# Patient Record
Sex: FEMALE | Race: WHITE | NOT HISPANIC OR LATINO
[De-identification: names, ages, dates, MRNs, and addresses within clinical notes are randomized per-mention and may not be internally consistent; named-entity substitution may affect disease eponyms.]

---

## 2020-09-28 ENCOUNTER — APPOINTMENT (OUTPATIENT)
Dept: ORTHOPEDIC SURGERY | Facility: CLINIC | Age: 65
End: 2020-09-28
Payer: MEDICARE

## 2020-09-28 VITALS
WEIGHT: 220 LBS | BODY MASS INDEX: 36.65 KG/M2 | DIASTOLIC BLOOD PRESSURE: 90 MMHG | HEART RATE: 76 BPM | HEIGHT: 65 IN | TEMPERATURE: 97.8 F | SYSTOLIC BLOOD PRESSURE: 181 MMHG

## 2020-09-28 DIAGNOSIS — Z86.39 PERSONAL HISTORY OF OTHER ENDOCRINE, NUTRITIONAL AND METABOLIC DISEASE: ICD-10-CM

## 2020-09-28 DIAGNOSIS — Z78.9 OTHER SPECIFIED HEALTH STATUS: ICD-10-CM

## 2020-09-28 DIAGNOSIS — Z86.79 PERSONAL HISTORY OF OTHER DISEASES OF THE CIRCULATORY SYSTEM: ICD-10-CM

## 2020-09-28 DIAGNOSIS — Z84.89 FAMILY HISTORY OF OTHER SPECIFIED CONDITIONS: ICD-10-CM

## 2020-09-28 PROCEDURE — 99203 OFFICE O/P NEW LOW 30 MIN: CPT

## 2020-09-28 PROCEDURE — 72110 X-RAY EXAM L-2 SPINE 4/>VWS: CPT

## 2020-09-28 RX ORDER — MULTIVITAMIN
TABLET ORAL
Refills: 0 | Status: ACTIVE | COMMUNITY

## 2020-09-28 RX ORDER — CALCIUM CARBONATE 600 MG
TABLET ORAL
Refills: 0 | Status: ACTIVE | COMMUNITY

## 2020-09-28 RX ORDER — EZETIMIBE 10 MG/1
TABLET ORAL
Refills: 0 | Status: ACTIVE | COMMUNITY

## 2020-09-29 ENCOUNTER — OUTPATIENT (OUTPATIENT)
Dept: OUTPATIENT SERVICES | Facility: HOSPITAL | Age: 65
LOS: 1 days | End: 2020-09-29
Payer: MEDICARE

## 2020-09-29 ENCOUNTER — APPOINTMENT (OUTPATIENT)
Dept: MRI IMAGING | Facility: HOSPITAL | Age: 65
End: 2020-09-29
Payer: MEDICARE

## 2020-09-29 DIAGNOSIS — M51.26 OTHER INTERVERTEBRAL DISC DISPLACEMENT, LUMBAR REGION: ICD-10-CM

## 2020-09-29 PROCEDURE — 72148 MRI LUMBAR SPINE W/O DYE: CPT

## 2020-09-29 PROCEDURE — 72148 MRI LUMBAR SPINE W/O DYE: CPT | Mod: 26

## 2020-09-29 RX ORDER — ATORVASTATIN CALCIUM 40 MG/1
40 TABLET, FILM COATED ORAL
Qty: 90 | Refills: 0 | Status: ACTIVE | COMMUNITY
Start: 2020-04-06

## 2020-09-29 RX ORDER — METOPROLOL SUCCINATE 50 MG/1
50 TABLET, EXTENDED RELEASE ORAL
Qty: 90 | Refills: 0 | Status: ACTIVE | COMMUNITY
Start: 2020-07-24

## 2020-09-29 RX ORDER — HYDROCHLOROTHIAZIDE 25 MG/1
25 TABLET ORAL
Qty: 90 | Refills: 0 | Status: ACTIVE | COMMUNITY
Start: 2020-06-30

## 2020-09-29 RX ORDER — OMEPRAZOLE 40 MG/1
40 CAPSULE, DELAYED RELEASE ORAL
Qty: 90 | Refills: 0 | Status: ACTIVE | COMMUNITY
Start: 2020-08-18

## 2020-09-29 RX ORDER — EZETIMIBE 10 MG/1
10 TABLET ORAL
Qty: 90 | Refills: 0 | Status: ACTIVE | COMMUNITY
Start: 2020-07-15

## 2020-10-05 ENCOUNTER — APPOINTMENT (OUTPATIENT)
Dept: ORTHOPEDIC SURGERY | Facility: CLINIC | Age: 65
End: 2020-10-05
Payer: MEDICARE

## 2020-10-05 VITALS — TEMPERATURE: 97.6 F | HEART RATE: 71 BPM | DIASTOLIC BLOOD PRESSURE: 82 MMHG | SYSTOLIC BLOOD PRESSURE: 169 MMHG

## 2020-10-05 PROCEDURE — 99213 OFFICE O/P EST LOW 20 MIN: CPT

## 2020-10-08 ENCOUNTER — EMERGENCY (EMERGENCY)
Facility: HOSPITAL | Age: 65
LOS: 1 days | Discharge: HOME | End: 2020-10-08
Attending: EMERGENCY MEDICINE | Admitting: EMERGENCY MEDICINE
Payer: MEDICARE

## 2020-10-08 VITALS
DIASTOLIC BLOOD PRESSURE: 92 MMHG | SYSTOLIC BLOOD PRESSURE: 163 MMHG | TEMPERATURE: 98 F | HEIGHT: 65 IN | HEART RATE: 82 BPM | OXYGEN SATURATION: 99 % | RESPIRATION RATE: 17 BRPM | WEIGHT: 220.02 LBS

## 2020-10-08 PROCEDURE — 99282 EMERGENCY DEPT VISIT SF MDM: CPT

## 2020-10-08 NOTE — ED ADULT NURSE NOTE - CHPI ED NUR SYMPTOMS NEG
no confusion/no bleeding/no tingling/no abrasion/no weakness/no loss of consciousness/no numbness/no vomiting

## 2020-10-08 NOTE — ED PROVIDER NOTE - MUSCULOSKELETAL, MLM
Spine appears normal, range of motion is not limited, no muscle or joint tenderness. No ttp over bilateral knees, no signs of trauma, no laxity, neg ant/post drawer test bilaterally, pelvis stable, normal ROM bilateral hips

## 2020-10-08 NOTE — ED PROVIDER NOTE - OBJECTIVE STATEMENT
Dr. Mejía: 65F h/o scleroderma, bulging disc, here for PT upstairs, tripped and fell on right knee, no head injury, able to ambulate, no chest pain or sob, no lightheadedness or dizziness. Pt does not want pain meds or imaging and wants to go home now.

## 2020-10-08 NOTE — ED PROVIDER NOTE - CLINICAL SUMMARY MEDICAL DECISION MAKING FREE TEXT BOX
Pt with mechanical fall on right knee, declined pain meds and imaging, no signs of trauma, can be dc'ed home.

## 2020-10-08 NOTE — ED PROVIDER NOTE - NSFOLLOWUPINSTRUCTIONS_ED_ALL_ED_FT
Knee Pain    WHAT YOU NEED TO KNOW:    Knee pain may start suddenly, or it may be a long-term problem. You may have pain on the side, front, or back of your knee. You may have knee stiffness and swelling. You may hear popping sounds or feel like your knee is giving way or locking up as you walk. You may feel pain when you sit, stand, walk, or climb up and down stairs. Knee pain can be caused by conditions such as obesity, inflammation, or strains or tears in ligaments or tendons.     DISCHARGE INSTRUCTIONS:    Return to the emergency department if:   •Your pain is worse, even after treatment.       •You cannot bend or straighten your leg completely.       •The swelling around your knee does not go down even with treatment.      •Your knee is painful and hot to the touch.       Contact your healthcare provider if:   •You have questions or concerns about your condition or care.           Medicines: You may need any of the following:   •NSAIDs help decrease swelling and pain or fever. This medicine is available with or without a doctor's order. NSAIDs can cause stomach bleeding or kidney problems in certain people. If you take blood thinner medicine, always ask your healthcare provider if NSAIDs are safe for you. Always read the medicine label and follow directions.      •Acetaminophen decreases pain and fever. It is available without a doctor's order. Ask how much to take and how often to take it. Follow directions. Read the labels of all other medicines you are using to see if they also contain acetaminophen, or ask your doctor or pharmacist. Acetaminophen can cause liver damage if not taken correctly. Do not use more than 4 grams (4,000 milligrams) total of acetaminophen in one day.       •Prescription pain medicine may be given. Ask your healthcare provider how to take this medicine safely. Some prescription pain medicines contain acetaminophen. Do not take other medicines that contain acetaminophen without talking to your healthcare provider. Too much acetaminophen may cause liver damage. Prescription pain medicine may cause constipation. Ask your healthcare provider how to prevent or treat constipation.       •Take your medicine as directed. Contact your healthcare provider if you think your medicine is not helping or if you have side effects. Tell him or her if you are allergic to any medicine. Keep a list of the medicines, vitamins, and herbs you take. Include the amounts, and when and why you take them. Bring the list or the pill bottles to follow-up visits. Carry your medicine list with you in case of an emergency.      What you can do to manage your symptoms:   •Rest your knee so it can heal. Limit activities that increase your pain. Do low-impact exercises, such as walking or swimming.       •Apply ice to help reduce swelling and pain. Use an ice pack, or put crushed ice in a plastic bag. Cover it with a towel before you apply it to your knee. Apply ice for 15 to 20 minutes every hour, or as directed.      •Apply compression to help reduce swelling. Use a brace or bandage only as directed.      •Elevate your knee to help decrease pain and swelling. Elevate your knee while you are sitting or lying down. Prop your leg on pillows to keep your knee above the level of your heart.      •Prevent your knee from moving as directed. Your healthcare provider may put on a cast or splint. You may need to wear a leg brace to stabilize your knee. A leg brace can be adjusted to increase your range of motion as your knee heals.  Hinged Knee Braces            What you can do to prevent knee pain:   •Maintain a healthy weight. Extra weight increases your risk for knee pain. Ask your healthcare provider how much you should weigh. He or she can help you create a safe weight loss plan if you need to lose weight.      •Exercise or train properly. Use the correct equipment for sports. Wear shoes that provide good support. Check your posture often as you exercise, play sports, or train for an event. This can help prevent stress and strain on your knees. Rest between sessions so you do not overwork your knees.      Follow up with your healthcare provider within 24 hours or as directed: You may need follow-up treatments, such as steroid injections to decrease pain. Write down your questions so you remember to ask them during your visits.

## 2020-10-08 NOTE — ED PROVIDER NOTE - CARE PLAN
Principal Discharge DX:	Knee injury, right, initial encounter  Secondary Diagnosis:	Fall, initial encounter

## 2020-10-08 NOTE — ED PROVIDER NOTE - CARDIAC, MLM
Normal rate, regular rhythm.  Heart sounds S1, S2.  No murmurs, rubs or gallops. (2) more than 100 beats/min

## 2020-10-08 NOTE — ED PROVIDER NOTE - PATIENT PORTAL LINK FT
You can access the FollowMyHealth Patient Portal offered by [facility] by registering at the following website: [email or web address]. By joining [employer]’s FollowMyHealth portal, you will also be able to view your health information using other applications (apps) compatible with our system.

## 2020-10-08 NOTE — ED ADULT NURSE NOTE - NSIMPLEMENTINTERV_GEN_ALL_ED
Spoke with patient who had post questions. she wanted to know if she can drive. Informed patient if she is not taking pain medications. patient made post op appointment to be seen in office on Friday. Implemented All Fall Risk Interventions:  [place] to call system. Call bell, personal items and telephone within reach. Instruct patient to call for assistance. Room bathroom lighting operational. Non-slip footwear when patient is off stretcher. Physically safe environment: no spills, clutter or unnecessary equipment. Stretcher in lowest position, wheels locked, appropriate side rails in place. Provide visual cue, wrist band, yellow gown, etc. Monitor gait and stability. Monitor for mental status changes and reorient to person, place, and time. Review medications for side effects contributing to fall risk. Reinforce activity limits and safety measures with patient and family.

## 2020-10-08 NOTE — ED ADULT NURSE NOTE - OBJECTIVE STATEMENT
patient presented to ED S/p Fall, as per patient she was walking the hallway, tripped and fell on her right knee, with both hands breaking the fall.  denies head strike, no LOC, no nausea, no dizziness no vomiting. complains of pain on right knee initially but none at the moment.

## 2020-10-12 DIAGNOSIS — S89.91XA UNSPECIFIED INJURY OF RIGHT LOWER LEG, INITIAL ENCOUNTER: ICD-10-CM

## 2020-11-02 ENCOUNTER — APPOINTMENT (OUTPATIENT)
Dept: ORTHOPEDIC SURGERY | Facility: CLINIC | Age: 65
End: 2020-11-02
Payer: MEDICARE

## 2020-11-02 ENCOUNTER — APPOINTMENT (OUTPATIENT)
Dept: MRI IMAGING | Facility: HOSPITAL | Age: 65
End: 2020-11-02
Payer: MEDICARE

## 2020-11-02 ENCOUNTER — OUTPATIENT (OUTPATIENT)
Dept: OUTPATIENT SERVICES | Facility: HOSPITAL | Age: 65
LOS: 1 days | End: 2020-11-02
Payer: MEDICARE

## 2020-11-02 VITALS — HEIGHT: 66 IN | WEIGHT: 220 LBS | TEMPERATURE: 97.2 F | BODY MASS INDEX: 35.36 KG/M2

## 2020-11-02 DIAGNOSIS — M51.26 OTHER INTERVERTEBRAL DISC DISPLACEMENT, LUMBAR REGION: ICD-10-CM

## 2020-11-02 PROCEDURE — 72148 MRI LUMBAR SPINE W/O DYE: CPT | Mod: 26

## 2020-11-02 PROCEDURE — 72148 MRI LUMBAR SPINE W/O DYE: CPT

## 2020-11-02 PROCEDURE — 99214 OFFICE O/P EST MOD 30 MIN: CPT

## 2020-11-02 RX ORDER — FUROSEMIDE 20 MG/1
20 TABLET ORAL
Qty: 30 | Refills: 0 | Status: ACTIVE | COMMUNITY
Start: 2020-10-09

## 2020-11-02 RX ORDER — CANDESARTAN CILEXETIL 16 MG/1
16 TABLET ORAL
Qty: 90 | Refills: 0 | Status: ACTIVE | COMMUNITY
Start: 2020-10-09

## 2020-11-09 ENCOUNTER — APPOINTMENT (OUTPATIENT)
Dept: ORTHOPEDIC SURGERY | Facility: CLINIC | Age: 65
End: 2020-11-09
Payer: MEDICARE

## 2020-11-09 VITALS — HEIGHT: 66 IN | BODY MASS INDEX: 35.36 KG/M2 | TEMPERATURE: 96 F | WEIGHT: 220 LBS

## 2020-11-09 PROCEDURE — 99214 OFFICE O/P EST MOD 30 MIN: CPT

## 2020-11-10 ENCOUNTER — TRANSCRIPTION ENCOUNTER (OUTPATIENT)
Age: 65
End: 2020-11-10

## 2020-11-18 ENCOUNTER — NON-APPOINTMENT (OUTPATIENT)
Age: 65
End: 2020-11-18

## 2020-11-22 ENCOUNTER — OUTPATIENT (OUTPATIENT)
Dept: OUTPATIENT SERVICES | Facility: HOSPITAL | Age: 65
LOS: 1 days | End: 2020-11-22
Payer: MEDICARE

## 2020-11-22 DIAGNOSIS — Z11.59 ENCOUNTER FOR SCREENING FOR OTHER VIRAL DISEASES: ICD-10-CM

## 2020-11-22 LAB — SARS-COV-2 RNA SPEC QL NAA+PROBE: SIGNIFICANT CHANGE UP

## 2020-11-22 PROCEDURE — U0003: CPT

## 2020-11-24 ENCOUNTER — OUTPATIENT (OUTPATIENT)
Dept: OUTPATIENT SERVICES | Facility: HOSPITAL | Age: 65
LOS: 1 days | End: 2020-11-24
Payer: MEDICARE

## 2020-11-24 DIAGNOSIS — M54.16 RADICULOPATHY, LUMBAR REGION: ICD-10-CM

## 2020-11-24 PROCEDURE — 64483 NJX AA&/STRD TFRM EPI L/S 1: CPT | Mod: RT

## 2020-12-09 ENCOUNTER — APPOINTMENT (OUTPATIENT)
Dept: ORTHOPEDIC SURGERY | Facility: CLINIC | Age: 65
End: 2020-12-09
Payer: MEDICARE

## 2020-12-09 VITALS — BODY MASS INDEX: 35.36 KG/M2 | TEMPERATURE: 97 F | HEIGHT: 66 IN | WEIGHT: 220 LBS

## 2020-12-09 DIAGNOSIS — M53.9 DORSOPATHY, UNSPECIFIED: ICD-10-CM

## 2020-12-09 PROCEDURE — 99214 OFFICE O/P EST MOD 30 MIN: CPT

## 2020-12-23 LAB
ALBUMIN SERPL ELPH-MCNC: 4.3 G/DL
ALP BLD-CCNC: 71 U/L
ALT SERPL-CCNC: 16 U/L
ANION GAP SERPL CALC-SCNC: 11 MMOL/L
APTT BLD: 35.4 SEC
AST SERPL-CCNC: 26 U/L
BASOPHILS # BLD AUTO: 0.05 K/UL
BASOPHILS NFR BLD AUTO: 0.7 %
BILIRUB SERPL-MCNC: 0.5 MG/DL
BUN SERPL-MCNC: 16 MG/DL
CALCIUM SERPL-MCNC: 9.6 MG/DL
CHLORIDE SERPL-SCNC: 98 MMOL/L
CO2 SERPL-SCNC: 29 MMOL/L
CREAT SERPL-MCNC: 0.95 MG/DL
EOSINOPHIL # BLD AUTO: 0.24 K/UL
EOSINOPHIL NFR BLD AUTO: 3.2 %
GLUCOSE SERPL-MCNC: 99 MG/DL
HCT VFR BLD CALC: 40.9 %
HGB BLD-MCNC: 13.3 G/DL
IMM GRANULOCYTES NFR BLD AUTO: 0.3 %
INR PPP: 1.07 RATIO
LYMPHOCYTES # BLD AUTO: 1.16 K/UL
LYMPHOCYTES NFR BLD AUTO: 15.4 %
MAN DIFF?: NORMAL
MCHC RBC-ENTMCNC: 32.5 GM/DL
MCHC RBC-ENTMCNC: 33.4 PG
MCV RBC AUTO: 102.8 FL
MONOCYTES # BLD AUTO: 0.94 K/UL
MONOCYTES NFR BLD AUTO: 12.5 %
NEUTROPHILS # BLD AUTO: 5.12 K/UL
NEUTROPHILS NFR BLD AUTO: 67.9 %
PLATELET # BLD AUTO: 230 K/UL
POTASSIUM SERPL-SCNC: 4.4 MMOL/L
PROT SERPL-MCNC: 6.8 G/DL
PT BLD: 12.6 SEC
RBC # BLD: 3.98 M/UL
RBC # FLD: 12.6 %
SODIUM SERPL-SCNC: 137 MMOL/L
WBC # FLD AUTO: 7.53 K/UL

## 2021-01-04 ENCOUNTER — OUTPATIENT (OUTPATIENT)
Dept: OUTPATIENT SERVICES | Facility: HOSPITAL | Age: 66
LOS: 1 days | End: 2021-01-04
Payer: MEDICARE

## 2021-01-04 VITALS
OXYGEN SATURATION: 100 % | DIASTOLIC BLOOD PRESSURE: 72 MMHG | HEIGHT: 65 IN | TEMPERATURE: 97 F | WEIGHT: 220.02 LBS | HEART RATE: 70 BPM | SYSTOLIC BLOOD PRESSURE: 179 MMHG | RESPIRATION RATE: 21 BRPM

## 2021-01-04 DIAGNOSIS — M51.26 OTHER INTERVERTEBRAL DISC DISPLACEMENT, LUMBAR REGION: ICD-10-CM

## 2021-01-04 DIAGNOSIS — Z01.818 ENCOUNTER FOR OTHER PREPROCEDURAL EXAMINATION: ICD-10-CM

## 2021-01-04 DIAGNOSIS — Z98.891 HISTORY OF UTERINE SCAR FROM PREVIOUS SURGERY: Chronic | ICD-10-CM

## 2021-01-04 DIAGNOSIS — Z98.890 OTHER SPECIFIED POSTPROCEDURAL STATES: Chronic | ICD-10-CM

## 2021-01-04 RX ORDER — DIAZEPAM 5 MG
1 TABLET ORAL
Qty: 0 | Refills: 0 | DISCHARGE

## 2021-01-04 RX ORDER — CANDESARTAN CILEXETIL 8 MG/1
1 TABLET ORAL
Qty: 0 | Refills: 0 | DISCHARGE

## 2021-01-04 RX ORDER — CYCLOBENZAPRINE HYDROCHLORIDE 10 MG/1
0 TABLET, FILM COATED ORAL
Qty: 0 | Refills: 0 | DISCHARGE

## 2021-01-04 RX ORDER — ATORVASTATIN CALCIUM 80 MG/1
1 TABLET, FILM COATED ORAL
Qty: 0 | Refills: 0 | DISCHARGE

## 2021-01-04 RX ORDER — GABAPENTIN 400 MG/1
0 CAPSULE ORAL
Qty: 0 | Refills: 0 | DISCHARGE

## 2021-01-04 NOTE — H&P PST ADULT - NSICDXPASTMEDICALHX_GEN_ALL_CORE_FT
PAST MEDICAL HISTORY:  Calcinosis, Raynaud's phenomenon, sclerodactyly, and telangiectasia (CREST) syndrome     Heart murmur     History of pleural effusion right 2014 ; resolved    HLD (hyperlipidemia)     HNP (herniated nucleus pulposus), lumbar     HTN (hypertension)     Leg swelling

## 2021-01-04 NOTE — H&P PST ADULT - HISTORY OF PRESENT ILLNESS
This is a 64 y/o female with 5 month history of persistent lower back pain radiating down right leg, shin /ankle with numbness, weakness and recurrent falls . She has been experiencing sharp pain since August and tried PT, MARTÍN and pain medication with no improvement . scheduled for microdiscectomy L4-5 right and any other indicated procedures on 1/12/20

## 2021-01-04 NOTE — H&P PST ADULT - ASSESSMENT
64 y/o female with HNP lumbar     scheduled for microdiscectomy L4-5 right and any other procedures   medical and cardiac clearance   pre op instructions on medications   COVID testing on 1/10/21

## 2021-01-04 NOTE — H&P PST ADULT - NSICDXFAMILYHX_GEN_ALL_CORE_FT
FAMILY HISTORY:  Family history of abdominal aortic aneurysm (AAA), mother  Family history of diabetes mellitus in sister  FHx: heart disease, parents

## 2021-01-04 NOTE — H&P PST ADULT - SOURCE OF INFORMATION, PROFILE
Medical history obtained via telephone as per COVID protocol. Physical exam to be done on DOS/patient

## 2021-01-04 NOTE — H&P PST ADULT - ENERGY EXPENDITURE (METS)
4 mets Purse String (Simple) Text: Given the location of the defect and the characteristics of the surrounding skin a purse string closure was deemed most appropriate.  Undermining was performed circumfirentially around the surgical defect.  A purse string suture was then placed and tightened.

## 2021-01-06 PROBLEM — M51.26 OTHER INTERVERTEBRAL DISC DISPLACEMENT, LUMBAR REGION: Chronic | Status: ACTIVE | Noted: 2021-01-04

## 2021-01-06 PROBLEM — E78.5 HYPERLIPIDEMIA, UNSPECIFIED: Chronic | Status: ACTIVE | Noted: 2021-01-04

## 2021-01-06 PROBLEM — Z87.09 PERSONAL HISTORY OF OTHER DISEASES OF THE RESPIRATORY SYSTEM: Chronic | Status: ACTIVE | Noted: 2021-01-04

## 2021-01-06 PROBLEM — R01.1 CARDIAC MURMUR, UNSPECIFIED: Chronic | Status: ACTIVE | Noted: 2021-01-04

## 2021-01-06 PROBLEM — M34.1 CR(E)ST SYNDROME: Chronic | Status: ACTIVE | Noted: 2021-01-04

## 2021-01-06 PROBLEM — M79.89 OTHER SPECIFIED SOFT TISSUE DISORDERS: Chronic | Status: ACTIVE | Noted: 2021-01-04

## 2021-01-06 PROBLEM — I10 ESSENTIAL (PRIMARY) HYPERTENSION: Chronic | Status: ACTIVE | Noted: 2021-01-04

## 2021-01-06 PROCEDURE — G0463: CPT

## 2021-01-07 NOTE — PROVIDER CONTACT NOTE (OTHER) - ASSESSMENT
The Spine Pre-Operative Education packet was given to the patient on 12/9/20. The patient and NP reviewed the information included in the packet. All her questions were answered and she gave a clear understanding of the instructions. She was advised to call the office at any time with further questions or concerns.

## 2021-01-10 ENCOUNTER — OUTPATIENT (OUTPATIENT)
Dept: OUTPATIENT SERVICES | Facility: HOSPITAL | Age: 66
LOS: 1 days | End: 2021-01-10
Payer: MEDICARE

## 2021-01-10 DIAGNOSIS — Z20.828 CONTACT WITH AND (SUSPECTED) EXPOSURE TO OTHER VIRAL COMMUNICABLE DISEASES: ICD-10-CM

## 2021-01-10 DIAGNOSIS — Z98.890 OTHER SPECIFIED POSTPROCEDURAL STATES: Chronic | ICD-10-CM

## 2021-01-10 DIAGNOSIS — Z98.891 HISTORY OF UTERINE SCAR FROM PREVIOUS SURGERY: Chronic | ICD-10-CM

## 2021-01-10 LAB — SARS-COV-2 RNA SPEC QL NAA+PROBE: SIGNIFICANT CHANGE UP

## 2021-01-10 PROCEDURE — U0005: CPT

## 2021-01-10 PROCEDURE — U0003: CPT

## 2021-01-11 ENCOUNTER — TRANSCRIPTION ENCOUNTER (OUTPATIENT)
Age: 66
End: 2021-01-11

## 2021-01-11 NOTE — ASU PATIENT PROFILE, ADULT - SURGICAL SITE INCISION
Detail Level: Simple Continue Regimen: Ketoconazole shampoo 2-3 times weekly for maintenance Plan: Discussed fluocinonide solution for occasional itching pt gets on scalp.  Pt defers Rx at this time, will call the office if needed in the future no

## 2021-01-11 NOTE — ASU PATIENT PROFILE, ADULT - PMH
Calcinosis, Raynaud's phenomenon, sclerodactyly, and telangiectasia (CREST) syndrome    Heart murmur    History of pleural effusion  right 2014 ; resolved  HLD (hyperlipidemia)    HNP (herniated nucleus pulposus), lumbar    HTN (hypertension)    Leg swelling

## 2021-01-12 ENCOUNTER — RESULT REVIEW (OUTPATIENT)
Age: 66
End: 2021-01-12

## 2021-01-12 ENCOUNTER — APPOINTMENT (OUTPATIENT)
Dept: ORTHOPEDIC SURGERY | Facility: HOSPITAL | Age: 66
End: 2021-01-12

## 2021-01-12 ENCOUNTER — OUTPATIENT (OUTPATIENT)
Dept: OUTPATIENT SERVICES | Facility: HOSPITAL | Age: 66
LOS: 1 days | End: 2021-01-12
Payer: MEDICARE

## 2021-01-12 VITALS
SYSTOLIC BLOOD PRESSURE: 161 MMHG | OXYGEN SATURATION: 100 % | RESPIRATION RATE: 15 BRPM | HEART RATE: 65 BPM | DIASTOLIC BLOOD PRESSURE: 74 MMHG

## 2021-01-12 DIAGNOSIS — M51.26 OTHER INTERVERTEBRAL DISC DISPLACEMENT, LUMBAR REGION: ICD-10-CM

## 2021-01-12 DIAGNOSIS — Z98.891 HISTORY OF UTERINE SCAR FROM PREVIOUS SURGERY: Chronic | ICD-10-CM

## 2021-01-12 DIAGNOSIS — Z98.890 OTHER SPECIFIED POSTPROCEDURAL STATES: Chronic | ICD-10-CM

## 2021-01-12 LAB — BLD GP AB SCN SERPL QL: SIGNIFICANT CHANGE UP

## 2021-01-12 PROCEDURE — 86900 BLOOD TYPING SEROLOGIC ABO: CPT

## 2021-01-12 PROCEDURE — 76000 FLUOROSCOPY <1 HR PHYS/QHP: CPT

## 2021-01-12 PROCEDURE — 97161 PT EVAL LOW COMPLEX 20 MIN: CPT

## 2021-01-12 PROCEDURE — 86850 RBC ANTIBODY SCREEN: CPT

## 2021-01-12 PROCEDURE — 63030 LAMOT DCMPRN NRV RT 1 LMBR: CPT | Mod: RT

## 2021-01-12 PROCEDURE — 88304 TISSUE EXAM BY PATHOLOGIST: CPT

## 2021-01-12 PROCEDURE — C1889: CPT

## 2021-01-12 PROCEDURE — 86901 BLOOD TYPING SEROLOGIC RH(D): CPT

## 2021-01-12 PROCEDURE — 36415 COLL VENOUS BLD VENIPUNCTURE: CPT

## 2021-01-12 PROCEDURE — 63030 LAMOT DCMPRN NRV RT 1 LMBR: CPT | Mod: 82,RT

## 2021-01-12 PROCEDURE — 88304 TISSUE EXAM BY PATHOLOGIST: CPT | Mod: 26

## 2021-01-12 RX ORDER — SODIUM CHLORIDE 9 MG/ML
1000 INJECTION, SOLUTION INTRAVENOUS
Refills: 0 | Status: DISCONTINUED | OUTPATIENT
Start: 2021-01-12 | End: 2021-01-12

## 2021-01-12 RX ORDER — DIAZEPAM 5 MG
1 TABLET ORAL
Qty: 10 | Refills: 0
Start: 2021-01-12

## 2021-01-12 RX ORDER — APREPITANT 80 MG/1
40 CAPSULE ORAL ONCE
Refills: 0 | Status: COMPLETED | OUTPATIENT
Start: 2021-01-12 | End: 2021-01-12

## 2021-01-12 RX ORDER — HYDROMORPHONE HYDROCHLORIDE 2 MG/ML
0.5 INJECTION INTRAMUSCULAR; INTRAVENOUS; SUBCUTANEOUS
Refills: 0 | Status: DISCONTINUED | OUTPATIENT
Start: 2021-01-12 | End: 2021-01-12

## 2021-01-12 RX ORDER — CEFAZOLIN SODIUM 1 G
2000 VIAL (EA) INJECTION ONCE
Refills: 0 | Status: DISCONTINUED | OUTPATIENT
Start: 2021-01-12 | End: 2021-01-12

## 2021-01-12 RX ORDER — ONDANSETRON 8 MG/1
4 TABLET, FILM COATED ORAL ONCE
Refills: 0 | Status: COMPLETED | OUTPATIENT
Start: 2021-01-12 | End: 2021-01-12

## 2021-01-12 RX ORDER — OXYCODONE HYDROCHLORIDE 5 MG/1
1 TABLET ORAL
Qty: 42 | Refills: 0
Start: 2021-01-12

## 2021-01-12 RX ORDER — CEFAZOLIN SODIUM 1 G
2000 VIAL (EA) INJECTION ONCE
Refills: 0 | Status: COMPLETED | OUTPATIENT
Start: 2021-01-12 | End: 2021-01-12

## 2021-01-12 RX ORDER — OXYCODONE HYDROCHLORIDE 5 MG/1
5 TABLET ORAL ONCE
Refills: 0 | Status: DISCONTINUED | OUTPATIENT
Start: 2021-01-12 | End: 2021-01-12

## 2021-01-12 RX ADMIN — HYDROMORPHONE HYDROCHLORIDE 0.5 MILLIGRAM(S): 2 INJECTION INTRAMUSCULAR; INTRAVENOUS; SUBCUTANEOUS at 11:06

## 2021-01-12 RX ADMIN — SODIUM CHLORIDE 75 MILLILITER(S): 9 INJECTION, SOLUTION INTRAVENOUS at 11:06

## 2021-01-12 RX ADMIN — APREPITANT 40 MILLIGRAM(S): 80 CAPSULE ORAL at 07:34

## 2021-01-12 RX ADMIN — ONDANSETRON 4 MILLIGRAM(S): 8 TABLET, FILM COATED ORAL at 11:06

## 2021-01-12 RX ADMIN — HYDROMORPHONE HYDROCHLORIDE 0.5 MILLIGRAM(S): 2 INJECTION INTRAMUSCULAR; INTRAVENOUS; SUBCUTANEOUS at 11:43

## 2021-01-12 NOTE — ASU DISCHARGE PLAN (ADULT/PEDIATRIC) - CALL YOUR DOCTOR IF YOU HAVE ANY OF THE FOLLOWING:
Bleeding that does not stop/Pain not relieved by Medications/Fever greater than (need to indicate Fahrenheit or Celsius)/Wound/Surgical Site with redness, or foul smelling discharge or pus/Unable to urinate/Inability to tolerate liquids or foods Bleeding that does not stop/Pain not relieved by Medications/Fever greater than (need to indicate Fahrenheit or Celsius)/Wound/Surgical Site with redness, or foul smelling discharge or pus/Numbness, tingling, color or temperature change to extremity/Unable to urinate/Inability to tolerate liquids or foods

## 2021-01-12 NOTE — ASU DISCHARGE PLAN (ADULT/PEDIATRIC) - ASU DC SPECIAL INSTRUCTIONSFT
You have just had spine surgery.  Please take care of your back by adhering to some basic recommendations.    Your surgeon recommends taking acetaminophen (tylenol) 1000mg 3 times per day for the next 14 to 21 days.  Apply icepacks to the surgical area to reduce swelling and discomfort.  This can help to minimize the need for stronger medications.    No heavy lifting. Do not lift more than 10 pounds.  Avoid twisting and bending over.  Do NOT drive a car.  You may be driven in a car.    You may shower if the dressing is the clear plastic type or if you cover the existing dressing with plastic and tape to prevent it from getting wet.  Change dressing with dry, sterile gauze and tape if it becomes loose, wet or soiled.     Observe low back precautions as described by the Physical Therapist.  Walking is your best exercise.  It is best not to remain in one position for long periods such as long car rides.    Constipation is a common problem associated with surgery and pain medications.  You should ensure that you are drinking plenty of fluids and increasing your fruit and vegetable intake.  You are advised to take Docusate 100mg three times per day to prevent opioid induced constipation.  To treat opioid induced constipation use Senna (Senokot) or Bisacodyl (Dulcolax) or PEG 3350 (Miralax) every evening until your first bowel movement and then every evening as needed.  To treat opioid induced bloating and gas pain use Simethicone (Gas-X) 80 mg per label directions.     Smoking should be avoided.  Tobacco products are associated with back problems.       Call the doctor with questions, fevers, severe headache, bowel or bladder dysfunction, new numbness/weakness or new pain not relieved by medication, ice pack and change of position.    You should see your surgeon for follow up within 14 days of surgery.

## 2021-01-12 NOTE — ASU DISCHARGE PLAN (ADULT/PEDIATRIC) - CARE PROVIDER_API CALL
Stone Hodgson (MD)  Orthopaedic Surgery  [address], [address]  [place] [address]  Phone: [phone]  Fax: [phone]  Follow Up Time:

## 2021-01-12 NOTE — BRIEF OPERATIVE NOTE - NSICDXBRIEFPOSTOP_GEN_ALL_CORE_FT
POST-OP DIAGNOSIS:  HNP (herniated nucleus pulposus), lumbar 12-Jan-2021 10:25:50 L4-5 Right Chuckie Perez

## 2021-01-12 NOTE — BRIEF OPERATIVE NOTE - NSICDXBRIEFPREOP_GEN_ALL_CORE_FT
PRE-OP DIAGNOSIS:  HNP (herniated nucleus pulposus), lumbar 12-Jan-2021 10:25:34 L4-5 Right Chuckie Perez

## 2021-01-13 ENCOUNTER — NON-APPOINTMENT (OUTPATIENT)
Age: 66
End: 2021-01-13

## 2021-01-25 ENCOUNTER — APPOINTMENT (OUTPATIENT)
Dept: ORTHOPEDIC SURGERY | Facility: CLINIC | Age: 66
End: 2021-01-25
Payer: MEDICARE

## 2021-01-25 VITALS — HEIGHT: 66 IN | TEMPERATURE: 98 F | BODY MASS INDEX: 35.36 KG/M2 | WEIGHT: 220 LBS

## 2021-01-25 DIAGNOSIS — M51.26 OTHER INTERVERTEBRAL DISC DISPLACEMENT, LUMBAR REGION: ICD-10-CM

## 2021-01-25 PROCEDURE — 99024 POSTOP FOLLOW-UP VISIT: CPT

## 2021-01-25 PROCEDURE — 72100 X-RAY EXAM L-S SPINE 2/3 VWS: CPT

## 2021-01-25 RX ORDER — GABAPENTIN 300 MG/1
300 CAPSULE ORAL
Qty: 90 | Refills: 0 | Status: ACTIVE | COMMUNITY
Start: 2020-12-11

## 2021-01-25 RX ORDER — OXYCODONE 5 MG/1
5 TABLET ORAL
Qty: 42 | Refills: 0 | Status: ACTIVE | COMMUNITY
Start: 2021-01-12

## 2021-01-25 RX ORDER — PREDNISONE 10 MG/1
10 TABLET ORAL
Qty: 50 | Refills: 0 | Status: COMPLETED | COMMUNITY
Start: 2020-09-08 | End: 2021-01-25

## 2021-01-25 RX ORDER — OXYCODONE AND ACETAMINOPHEN 7.5; 325 MG/1; MG/1
7.5-325 TABLET ORAL
Qty: 42 | Refills: 0 | Status: COMPLETED | COMMUNITY
Start: 2020-09-15 | End: 2021-01-25

## 2021-01-25 RX ORDER — GABAPENTIN 100 MG/1
100 CAPSULE ORAL
Qty: 90 | Refills: 0 | Status: DISCONTINUED | COMMUNITY
Start: 2020-09-28 | End: 2021-01-25

## 2021-01-25 RX ORDER — CYCLOBENZAPRINE HYDROCHLORIDE 10 MG/1
10 TABLET, FILM COATED ORAL
Qty: 60 | Refills: 0 | Status: DISCONTINUED | COMMUNITY
Start: 2020-09-08 | End: 2021-01-25

## 2021-01-25 RX ORDER — DIAZEPAM 5 MG/1
5 TABLET ORAL
Qty: 30 | Refills: 0 | Status: DISCONTINUED | COMMUNITY
Start: 2020-08-27 | End: 2021-01-25

## 2021-01-25 RX ORDER — METHYLPREDNISOLONE 4 MG/1
4 TABLET ORAL
Qty: 21 | Refills: 0 | Status: COMPLETED | COMMUNITY
Start: 2020-08-18 | End: 2021-01-25

## 2021-02-26 DIAGNOSIS — Z01.818 ENCOUNTER FOR OTHER PREPROCEDURAL EXAMINATION: ICD-10-CM

## 2021-02-26 DIAGNOSIS — Z01.812 ENCOUNTER FOR PREPROCEDURAL LABORATORY EXAMINATION: ICD-10-CM

## 2021-03-08 ENCOUNTER — APPOINTMENT (OUTPATIENT)
Dept: ORTHOPEDIC SURGERY | Facility: CLINIC | Age: 66
End: 2021-03-08
Payer: MEDICARE

## 2021-03-08 VITALS
BODY MASS INDEX: 34.72 KG/M2 | HEART RATE: 69 BPM | WEIGHT: 216 LBS | SYSTOLIC BLOOD PRESSURE: 173 MMHG | TEMPERATURE: 96.7 F | HEIGHT: 66 IN | DIASTOLIC BLOOD PRESSURE: 68 MMHG

## 2021-03-08 PROCEDURE — 99024 POSTOP FOLLOW-UP VISIT: CPT

## 2021-04-19 ENCOUNTER — APPOINTMENT (OUTPATIENT)
Dept: ORTHOPEDIC SURGERY | Facility: CLINIC | Age: 66
End: 2021-04-19
Payer: MEDICARE

## 2021-04-19 VITALS
DIASTOLIC BLOOD PRESSURE: 80 MMHG | TEMPERATURE: 97.5 F | HEIGHT: 66 IN | HEART RATE: 69 BPM | SYSTOLIC BLOOD PRESSURE: 179 MMHG | WEIGHT: 215 LBS | BODY MASS INDEX: 34.55 KG/M2

## 2021-04-19 PROCEDURE — 99213 OFFICE O/P EST LOW 20 MIN: CPT

## 2021-04-19 RX ORDER — CEPHALEXIN 500 MG/1
500 CAPSULE ORAL
Qty: 30 | Refills: 0 | Status: ACTIVE | COMMUNITY
Start: 2020-11-02

## 2021-07-19 ENCOUNTER — APPOINTMENT (OUTPATIENT)
Dept: ORTHOPEDIC SURGERY | Facility: CLINIC | Age: 66
End: 2021-07-19
Payer: MEDICARE

## 2021-07-19 VITALS — DIASTOLIC BLOOD PRESSURE: 83 MMHG | HEART RATE: 61 BPM | SYSTOLIC BLOOD PRESSURE: 170 MMHG

## 2021-07-19 DIAGNOSIS — Z98.890 OTHER SPECIFIED POSTPROCEDURAL STATES: ICD-10-CM

## 2021-07-19 PROCEDURE — 99214 OFFICE O/P EST MOD 30 MIN: CPT

## 2023-11-13 ENCOUNTER — INPATIENT (INPATIENT)
Facility: HOSPITAL | Age: 68
LOS: 1 days | Discharge: HOME | DRG: 64 | End: 2023-11-15
Attending: STUDENT IN AN ORGANIZED HEALTH CARE EDUCATION/TRAINING PROGRAM | Admitting: FAMILY MEDICINE
Payer: MEDICARE

## 2023-11-13 VITALS
DIASTOLIC BLOOD PRESSURE: 88 MMHG | RESPIRATION RATE: 16 BRPM | SYSTOLIC BLOOD PRESSURE: 179 MMHG | TEMPERATURE: 98 F | WEIGHT: 220.02 LBS | HEART RATE: 60 BPM | HEIGHT: 65 IN | OXYGEN SATURATION: 99 %

## 2023-11-13 DIAGNOSIS — Z98.891 HISTORY OF UTERINE SCAR FROM PREVIOUS SURGERY: Chronic | ICD-10-CM

## 2023-11-13 DIAGNOSIS — I63.9 CEREBRAL INFARCTION, UNSPECIFIED: ICD-10-CM

## 2023-11-13 DIAGNOSIS — Z98.890 OTHER SPECIFIED POSTPROCEDURAL STATES: Chronic | ICD-10-CM

## 2023-11-13 LAB
ALBUMIN SERPL ELPH-MCNC: 3.9 G/DL — SIGNIFICANT CHANGE UP (ref 3.3–5)
ALBUMIN SERPL ELPH-MCNC: 3.9 G/DL — SIGNIFICANT CHANGE UP (ref 3.3–5)
ALP SERPL-CCNC: 84 U/L — SIGNIFICANT CHANGE UP (ref 40–120)
ALP SERPL-CCNC: 84 U/L — SIGNIFICANT CHANGE UP (ref 40–120)
ALT FLD-CCNC: 25 U/L — SIGNIFICANT CHANGE UP (ref 10–45)
ALT FLD-CCNC: 25 U/L — SIGNIFICANT CHANGE UP (ref 10–45)
ANION GAP SERPL CALC-SCNC: 9 MMOL/L — SIGNIFICANT CHANGE UP (ref 5–17)
ANION GAP SERPL CALC-SCNC: 9 MMOL/L — SIGNIFICANT CHANGE UP (ref 5–17)
APPEARANCE UR: CLEAR — SIGNIFICANT CHANGE UP
APPEARANCE UR: CLEAR — SIGNIFICANT CHANGE UP
APTT BLD: 36.9 SEC — HIGH (ref 24.5–35.6)
APTT BLD: 36.9 SEC — HIGH (ref 24.5–35.6)
AST SERPL-CCNC: 35 U/L — SIGNIFICANT CHANGE UP (ref 10–40)
AST SERPL-CCNC: 35 U/L — SIGNIFICANT CHANGE UP (ref 10–40)
BACTERIA # UR AUTO: NEGATIVE /HPF — SIGNIFICANT CHANGE UP
BACTERIA # UR AUTO: NEGATIVE /HPF — SIGNIFICANT CHANGE UP
BASOPHILS # BLD AUTO: 0.06 K/UL — SIGNIFICANT CHANGE UP (ref 0–0.2)
BASOPHILS # BLD AUTO: 0.06 K/UL — SIGNIFICANT CHANGE UP (ref 0–0.2)
BASOPHILS NFR BLD AUTO: 0.7 % — SIGNIFICANT CHANGE UP (ref 0–2)
BASOPHILS NFR BLD AUTO: 0.7 % — SIGNIFICANT CHANGE UP (ref 0–2)
BILIRUB SERPL-MCNC: 0.5 MG/DL — SIGNIFICANT CHANGE UP (ref 0.2–1.2)
BILIRUB SERPL-MCNC: 0.5 MG/DL — SIGNIFICANT CHANGE UP (ref 0.2–1.2)
BILIRUB UR-MCNC: NEGATIVE — SIGNIFICANT CHANGE UP
BILIRUB UR-MCNC: NEGATIVE — SIGNIFICANT CHANGE UP
BUN SERPL-MCNC: 27 MG/DL — HIGH (ref 7–23)
BUN SERPL-MCNC: 27 MG/DL — HIGH (ref 7–23)
CALCIUM SERPL-MCNC: 9.9 MG/DL — SIGNIFICANT CHANGE UP (ref 8.4–10.5)
CALCIUM SERPL-MCNC: 9.9 MG/DL — SIGNIFICANT CHANGE UP (ref 8.4–10.5)
CHLORIDE SERPL-SCNC: 99 MMOL/L — SIGNIFICANT CHANGE UP (ref 96–108)
CHLORIDE SERPL-SCNC: 99 MMOL/L — SIGNIFICANT CHANGE UP (ref 96–108)
CO2 SERPL-SCNC: 25 MMOL/L — SIGNIFICANT CHANGE UP (ref 22–31)
CO2 SERPL-SCNC: 25 MMOL/L — SIGNIFICANT CHANGE UP (ref 22–31)
COLOR SPEC: YELLOW — SIGNIFICANT CHANGE UP
COLOR SPEC: YELLOW — SIGNIFICANT CHANGE UP
CREAT SERPL-MCNC: 1.06 MG/DL — SIGNIFICANT CHANGE UP (ref 0.5–1.3)
CREAT SERPL-MCNC: 1.06 MG/DL — SIGNIFICANT CHANGE UP (ref 0.5–1.3)
DIFF PNL FLD: NEGATIVE — SIGNIFICANT CHANGE UP
DIFF PNL FLD: NEGATIVE — SIGNIFICANT CHANGE UP
EGFR: 57 ML/MIN/1.73M2 — LOW
EGFR: 57 ML/MIN/1.73M2 — LOW
EOSINOPHIL # BLD AUTO: 0.1 K/UL — SIGNIFICANT CHANGE UP (ref 0–0.5)
EOSINOPHIL # BLD AUTO: 0.1 K/UL — SIGNIFICANT CHANGE UP (ref 0–0.5)
EOSINOPHIL NFR BLD AUTO: 1.1 % — SIGNIFICANT CHANGE UP (ref 0–6)
EOSINOPHIL NFR BLD AUTO: 1.1 % — SIGNIFICANT CHANGE UP (ref 0–6)
EPI CELLS # UR: 1 — SIGNIFICANT CHANGE UP
EPI CELLS # UR: 1 — SIGNIFICANT CHANGE UP
GLUCOSE SERPL-MCNC: 114 MG/DL — HIGH (ref 70–99)
GLUCOSE SERPL-MCNC: 114 MG/DL — HIGH (ref 70–99)
GLUCOSE UR QL: NEGATIVE MG/DL — SIGNIFICANT CHANGE UP
GLUCOSE UR QL: NEGATIVE MG/DL — SIGNIFICANT CHANGE UP
HCT VFR BLD CALC: 43.7 % — SIGNIFICANT CHANGE UP (ref 34.5–45)
HCT VFR BLD CALC: 43.7 % — SIGNIFICANT CHANGE UP (ref 34.5–45)
HGB BLD-MCNC: 14.8 G/DL — SIGNIFICANT CHANGE UP (ref 11.5–15.5)
HGB BLD-MCNC: 14.8 G/DL — SIGNIFICANT CHANGE UP (ref 11.5–15.5)
IMM GRANULOCYTES NFR BLD AUTO: 0.2 % — SIGNIFICANT CHANGE UP (ref 0–0.9)
IMM GRANULOCYTES NFR BLD AUTO: 0.2 % — SIGNIFICANT CHANGE UP (ref 0–0.9)
INR BLD: 1.02 RATIO — SIGNIFICANT CHANGE UP (ref 0.85–1.18)
INR BLD: 1.02 RATIO — SIGNIFICANT CHANGE UP (ref 0.85–1.18)
KETONES UR-MCNC: NEGATIVE MG/DL — SIGNIFICANT CHANGE UP
KETONES UR-MCNC: NEGATIVE MG/DL — SIGNIFICANT CHANGE UP
LEUKOCYTE ESTERASE UR-ACNC: ABNORMAL
LEUKOCYTE ESTERASE UR-ACNC: ABNORMAL
LYMPHOCYTES # BLD AUTO: 1.12 K/UL — SIGNIFICANT CHANGE UP (ref 1–3.3)
LYMPHOCYTES # BLD AUTO: 1.12 K/UL — SIGNIFICANT CHANGE UP (ref 1–3.3)
LYMPHOCYTES # BLD AUTO: 12.1 % — LOW (ref 13–44)
LYMPHOCYTES # BLD AUTO: 12.1 % — LOW (ref 13–44)
MCHC RBC-ENTMCNC: 33.3 PG — SIGNIFICANT CHANGE UP (ref 27–34)
MCHC RBC-ENTMCNC: 33.3 PG — SIGNIFICANT CHANGE UP (ref 27–34)
MCHC RBC-ENTMCNC: 33.9 GM/DL — SIGNIFICANT CHANGE UP (ref 32–36)
MCHC RBC-ENTMCNC: 33.9 GM/DL — SIGNIFICANT CHANGE UP (ref 32–36)
MCV RBC AUTO: 98.4 FL — SIGNIFICANT CHANGE UP (ref 80–100)
MCV RBC AUTO: 98.4 FL — SIGNIFICANT CHANGE UP (ref 80–100)
MONOCYTES # BLD AUTO: 0.85 K/UL — SIGNIFICANT CHANGE UP (ref 0–0.9)
MONOCYTES # BLD AUTO: 0.85 K/UL — SIGNIFICANT CHANGE UP (ref 0–0.9)
MONOCYTES NFR BLD AUTO: 9.2 % — SIGNIFICANT CHANGE UP (ref 2–14)
MONOCYTES NFR BLD AUTO: 9.2 % — SIGNIFICANT CHANGE UP (ref 2–14)
NEUTROPHILS # BLD AUTO: 7.08 K/UL — SIGNIFICANT CHANGE UP (ref 1.8–7.4)
NEUTROPHILS # BLD AUTO: 7.08 K/UL — SIGNIFICANT CHANGE UP (ref 1.8–7.4)
NEUTROPHILS NFR BLD AUTO: 76.7 % — SIGNIFICANT CHANGE UP (ref 43–77)
NEUTROPHILS NFR BLD AUTO: 76.7 % — SIGNIFICANT CHANGE UP (ref 43–77)
NITRITE UR-MCNC: NEGATIVE — SIGNIFICANT CHANGE UP
NITRITE UR-MCNC: NEGATIVE — SIGNIFICANT CHANGE UP
NRBC # BLD: 0 /100 WBCS — SIGNIFICANT CHANGE UP (ref 0–0)
NRBC # BLD: 0 /100 WBCS — SIGNIFICANT CHANGE UP (ref 0–0)
PH UR: 5.5 — SIGNIFICANT CHANGE UP (ref 5–8)
PH UR: 5.5 — SIGNIFICANT CHANGE UP (ref 5–8)
PLATELET # BLD AUTO: 203 K/UL — SIGNIFICANT CHANGE UP (ref 150–400)
PLATELET # BLD AUTO: 203 K/UL — SIGNIFICANT CHANGE UP (ref 150–400)
POTASSIUM SERPL-MCNC: 4.6 MMOL/L — SIGNIFICANT CHANGE UP (ref 3.5–5.3)
POTASSIUM SERPL-MCNC: 4.6 MMOL/L — SIGNIFICANT CHANGE UP (ref 3.5–5.3)
POTASSIUM SERPL-SCNC: 4.6 MMOL/L — SIGNIFICANT CHANGE UP (ref 3.5–5.3)
POTASSIUM SERPL-SCNC: 4.6 MMOL/L — SIGNIFICANT CHANGE UP (ref 3.5–5.3)
PROT SERPL-MCNC: 8.4 G/DL — HIGH (ref 6–8.3)
PROT SERPL-MCNC: 8.4 G/DL — HIGH (ref 6–8.3)
PROT UR-MCNC: NEGATIVE MG/DL — SIGNIFICANT CHANGE UP
PROT UR-MCNC: NEGATIVE MG/DL — SIGNIFICANT CHANGE UP
PROTHROM AB SERPL-ACNC: 11.9 SEC — SIGNIFICANT CHANGE UP (ref 9.5–13)
PROTHROM AB SERPL-ACNC: 11.9 SEC — SIGNIFICANT CHANGE UP (ref 9.5–13)
RBC # BLD: 4.44 M/UL — SIGNIFICANT CHANGE UP (ref 3.8–5.2)
RBC # BLD: 4.44 M/UL — SIGNIFICANT CHANGE UP (ref 3.8–5.2)
RBC # FLD: 12.8 % — SIGNIFICANT CHANGE UP (ref 10.3–14.5)
RBC # FLD: 12.8 % — SIGNIFICANT CHANGE UP (ref 10.3–14.5)
RBC CASTS # UR COMP ASSIST: 0 /HPF — SIGNIFICANT CHANGE UP (ref 0–4)
RBC CASTS # UR COMP ASSIST: 0 /HPF — SIGNIFICANT CHANGE UP (ref 0–4)
SODIUM SERPL-SCNC: 133 MMOL/L — LOW (ref 135–145)
SODIUM SERPL-SCNC: 133 MMOL/L — LOW (ref 135–145)
SP GR SPEC: 1.01 — SIGNIFICANT CHANGE UP (ref 1–1.03)
SP GR SPEC: 1.01 — SIGNIFICANT CHANGE UP (ref 1–1.03)
UROBILINOGEN FLD QL: 0.2 MG/DL — SIGNIFICANT CHANGE UP (ref 0.2–1)
UROBILINOGEN FLD QL: 0.2 MG/DL — SIGNIFICANT CHANGE UP (ref 0.2–1)
WBC # BLD: 9.23 K/UL — SIGNIFICANT CHANGE UP (ref 3.8–10.5)
WBC # BLD: 9.23 K/UL — SIGNIFICANT CHANGE UP (ref 3.8–10.5)
WBC # FLD AUTO: 9.23 K/UL — SIGNIFICANT CHANGE UP (ref 3.8–10.5)
WBC # FLD AUTO: 9.23 K/UL — SIGNIFICANT CHANGE UP (ref 3.8–10.5)
WBC UR QL: 2 /HPF — SIGNIFICANT CHANGE UP (ref 0–5)
WBC UR QL: 2 /HPF — SIGNIFICANT CHANGE UP (ref 0–5)

## 2023-11-13 PROCEDURE — 70450 CT HEAD/BRAIN W/O DYE: CPT | Mod: 26,MA

## 2023-11-13 PROCEDURE — 70498 CT ANGIOGRAPHY NECK: CPT | Mod: 26

## 2023-11-13 PROCEDURE — 71045 X-RAY EXAM CHEST 1 VIEW: CPT | Mod: 26

## 2023-11-13 PROCEDURE — 93010 ELECTROCARDIOGRAM REPORT: CPT

## 2023-11-13 PROCEDURE — 99223 1ST HOSP IP/OBS HIGH 75: CPT

## 2023-11-13 PROCEDURE — 70496 CT ANGIOGRAPHY HEAD: CPT | Mod: 26

## 2023-11-13 PROCEDURE — 99285 EMERGENCY DEPT VISIT HI MDM: CPT

## 2023-11-13 RX ORDER — FUROSEMIDE 40 MG
0 TABLET ORAL
Qty: 0 | Refills: 0 | DISCHARGE

## 2023-11-13 RX ORDER — ASPIRIN/CALCIUM CARB/MAGNESIUM 324 MG
324 TABLET ORAL ONCE
Refills: 0 | Status: COMPLETED | OUTPATIENT
Start: 2023-11-13 | End: 2023-11-13

## 2023-11-13 RX ORDER — MULTIVIT-MIN/FERROUS GLUCONATE 9 MG/15 ML
15 LIQUID (ML) ORAL DAILY
Refills: 0 | Status: DISCONTINUED | OUTPATIENT
Start: 2023-11-13 | End: 2023-11-15

## 2023-11-13 RX ORDER — ENOXAPARIN SODIUM 100 MG/ML
40 INJECTION SUBCUTANEOUS EVERY 24 HOURS
Refills: 0 | Status: DISCONTINUED | OUTPATIENT
Start: 2023-11-13 | End: 2023-11-14

## 2023-11-13 RX ORDER — ACETAMINOPHEN 500 MG
975 TABLET ORAL ONCE
Refills: 0 | Status: COMPLETED | OUTPATIENT
Start: 2023-11-13 | End: 2023-11-13

## 2023-11-13 RX ORDER — LOSARTAN POTASSIUM 100 MG/1
50 TABLET, FILM COATED ORAL DAILY
Refills: 0 | Status: DISCONTINUED | OUTPATIENT
Start: 2023-11-13 | End: 2023-11-13

## 2023-11-13 RX ORDER — ATORVASTATIN CALCIUM 80 MG/1
80 TABLET, FILM COATED ORAL AT BEDTIME
Refills: 0 | Status: DISCONTINUED | OUTPATIENT
Start: 2023-11-13 | End: 2023-11-15

## 2023-11-13 RX ORDER — PANTOPRAZOLE SODIUM 20 MG/1
40 TABLET, DELAYED RELEASE ORAL
Refills: 0 | Status: DISCONTINUED | OUTPATIENT
Start: 2023-11-13 | End: 2023-11-15

## 2023-11-13 RX ORDER — METOPROLOL TARTRATE 50 MG
50 TABLET ORAL DAILY
Refills: 0 | Status: DISCONTINUED | OUTPATIENT
Start: 2023-11-13 | End: 2023-11-13

## 2023-11-13 RX ORDER — ONDANSETRON 8 MG/1
4 TABLET, FILM COATED ORAL EVERY 8 HOURS
Refills: 0 | Status: DISCONTINUED | OUTPATIENT
Start: 2023-11-13 | End: 2023-11-15

## 2023-11-13 RX ORDER — ACETAMINOPHEN 500 MG
2 TABLET ORAL
Qty: 0 | Refills: 0 | DISCHARGE

## 2023-11-13 RX ORDER — ASPIRIN/CALCIUM CARB/MAGNESIUM 324 MG
162 TABLET ORAL DAILY
Refills: 0 | Status: DISCONTINUED | OUTPATIENT
Start: 2023-11-14 | End: 2023-11-14

## 2023-11-13 RX ORDER — ACETAMINOPHEN 500 MG
650 TABLET ORAL EVERY 6 HOURS
Refills: 0 | Status: DISCONTINUED | OUTPATIENT
Start: 2023-11-13 | End: 2023-11-15

## 2023-11-13 RX ORDER — ATORVASTATIN CALCIUM 80 MG/1
40 TABLET, FILM COATED ORAL AT BEDTIME
Refills: 0 | Status: DISCONTINUED | OUTPATIENT
Start: 2023-11-13 | End: 2023-11-13

## 2023-11-13 RX ORDER — METOPROLOL TARTRATE 50 MG
50 TABLET ORAL DAILY
Refills: 0 | Status: DISCONTINUED | OUTPATIENT
Start: 2023-11-13 | End: 2023-11-15

## 2023-11-13 RX ORDER — GABAPENTIN 400 MG/1
0 CAPSULE ORAL
Qty: 0 | Refills: 0 | DISCHARGE

## 2023-11-13 RX ADMIN — Medication 324 MILLIGRAM(S): at 14:23

## 2023-11-13 RX ADMIN — ATORVASTATIN CALCIUM 80 MILLIGRAM(S): 80 TABLET, FILM COATED ORAL at 22:37

## 2023-11-13 RX ADMIN — Medication 975 MILLIGRAM(S): at 13:40

## 2023-11-13 NOTE — CONSULT NOTE ADULT - SUBJECTIVE AND OBJECTIVE BOX
Patient is a 68 year old woman admitted today, 11/13/23.  She states  friday, 11/10/23 she noted  double vision with images vertical. This resolved and the next day, she noted blurring of the far right visual field. She notes an intermittent mild frontal headache which resolves with tylenol. She denies other associated neurological complaints. She denies fever chest pain, trauma. She states she has continued to improve with minimal blurring of the far right visual field. She saw an ophthalmalogist, Dr. Guzman, and referred to hospital for stroke evaluation.    PMH: HLD          HTN          Lumbar disc disease         S/P cervical discectomy 2000    SH: No allergy    Exam: Awake, alert, appropriate           Pupils 2.5mm, EOM intact, no nystagmus, VFF           CN II-XII intact           Motor tone and strength  RUE  5/5      LUE   5/5                                                  RLE  5/5      LLE   5/5                          14.8   9.23  )-----------( 203      ( 13 Nov 2023 13:45 )             43.7   11-13    133<L>  |  99  |  27<H>  ----------------------------<  114<H>  4.6   |  25  |  1.06    Ca    9.9      13 Nov 2023 13:45    TPro  8.4<H>  /  Alb  3.9  /  TBili  0.5  /  DBili  x   /  AST  35  /  ALT  25  /  AlkPhos  84  11-13      < from: CT Head No Cont (11.13.23 @ 13:44) >    FINDINGS: Hypoattenuation is seen involving the left paramedian temporal   and occipital lobes and the PCA distribution with sulcal effacement. No   hemorrhagic transformation is seen.    There is no acute intracranial hemorrhage, shift of the midline   structures, herniation, or hydrocephalus.    The paranasal sinuses and tympanomastoid cavities are clear. The   calvarium appears intact. The orbits appear unremarkable.    IMPRESSION: Acute/subacute left-sided PCA distribution infarction   affecting the left posteromedial temporal and left paramedian occipital   lobes with associated cytotoxic edema. No hemorrhagic transformation.    Dr. Walker Pandey discussed findings with LUIS F Hairston on 11/13/2023 at 1:45 PM    --- End of Report ---            WALKER PANDEY MD; Attending Radiologist  This document has been electronically signed. Nov 13 2023  1:47PM

## 2023-11-13 NOTE — H&P ADULT - HISTORY OF PRESENT ILLNESS
67yo female with PMHx as below presenting with blurred vision. On friday, after dinner, started having double vision (vertically). This improved and on Saturday, was having blurred vision of the outer half of her right eye. This gradually improved over the weekend. Was seen today by her optho and was sent to ED to r/o cva/tia. Upon admission, she is still with slight blurring of her outer right visual field. Denies any cp, palpitations, dizziness, syncope or other complaints.     Family at bedside, all questions answered.

## 2023-11-13 NOTE — ED ADULT TRIAGE NOTE - CHIEF COMPLAINT QUOTE
sent in by PCP for double vision in B/L eyes with HA since friday. pt reports it started in the left eye then moved to the right eye. Denies any n/v, dizziness, numbness/tingling. pt last took tylenol at 4 am with relief of HA.

## 2023-11-13 NOTE — ED PROVIDER NOTE - NS ED ATTENDING STATEMENT MOD
normal... Well appearing, awake, alert, oriented to person, place, time/situation and in no apparent distress. Attending Only

## 2023-11-13 NOTE — H&P ADULT - NSICDXFAMILYHX_GEN_ALL_CORE_FT
Include Location In Plan?: No Detail Level: Zone FAMILY HISTORY:  Family history of abdominal aortic aneurysm (AAA), mother  Family history of diabetes mellitus in sister  FHx: heart disease, parents

## 2023-11-13 NOTE — H&P ADULT - NSHPLABSRESULTS_GEN_ALL_CORE
LABS:                        14.8   9.23  )-----------( 203      ( 13 Nov 2023 13:45 )             43.7     11-13    133<L>  |  99  |  27<H>  ----------------------------<  114<H>  4.6   |  25  |  1.06    Ca    9.9      13 Nov 2023 13:45    TPro  8.4<H>  /  Alb  3.9  /  TBili  0.5  /  DBili  x   /  AST  35  /  ALT  25  /  AlkPhos  84  11-13    PT/INR - ( 13 Nov 2023 14:20 )   PT: 11.9 sec;   INR: 1.02 ratio         PTT - ( 13 Nov 2023 14:20 )  PTT:36.9 sec  Urinalysis Basic - ( 13 Nov 2023 13:45 )    Color: x / Appearance: x / SG: x / pH: x  Gluc: 114 mg/dL / Ketone: x  / Bili: x / Urobili: x   Blood: x / Protein: x / Nitrite: x   Leuk Esterase: x / RBC: x / WBC x   Sq Epi: x / Non Sq Epi: x / Bacteria: x       CAPILLARY BLOOD GLUCOSE            Urinalysis Basic - ( 13 Nov 2023 13:45 )    Color: x / Appearance: x / SG: x / pH: x  Gluc: 114 mg/dL / Ketone: x  / Bili: x / Urobili: x   Blood: x / Protein: x / Nitrite: x   Leuk Esterase: x / RBC: x / WBC x   Sq Epi: x / Non Sq Epi: x / Bacteria: x        RADIOLOGY & ADDITIONAL TESTS:     CT brain: Acute/subacute left-sided PCA distribution infarction   affecting the left posteromedial temporal and left paramedian occipital   lobes with associated cytotoxic edema. No hemorrhagic transformation.    ekg: NSR at 66pm.     Consultant(s) Notes Reviewed:  [x ] YES  [ ] NO  Care Discussed with Consultants/Other Providers [ x] YES  [ ] NO  Imaging Personally Reviewed:  [x ] YES  [ ] NO

## 2023-11-13 NOTE — ED ADULT NURSE NOTE - NSFALLUNIVINTERV_ED_ALL_ED
Bed/Stretcher in lowest position, wheels locked, appropriate side rails in place/Call bell, personal items and telephone in reach/Instruct patient to call for assistance before getting out of bed/chair/stretcher/Non-slip footwear applied when patient is off stretcher/[place] to call system/Physically safe environment - no spills, clutter or unnecessary equipment/Purposeful proactive rounding/Room/bathroom lighting operational, light cord in reach

## 2023-11-13 NOTE — CONSULT NOTE ADULT - ASSESSMENT
Patient is a 68 year old woman admitted today, 11/13/23.  She states  friday, 11/10/23 she noted  double vision with images vertical. This resolved and the next day, she noted blurring of the far right visual field. She notes an intermittent mild frontal headache which resolves with tylenol. She denies other associated neurological complaints. She denies fever chest pain, trauma. She states she has continued to improve with minimal blurring of the far right visual field. She saw an ophthalmalogist, Dr. Guzman, and referred to hospital for stroke evaluation. Neurological exam as above. Would be concerned with acute cerebral ischemia in the area seen on CT Head, Left PCA distribution.    1) CT head as above acute/subacute PCA distribution infarction left posteromedial temporal and left paramedian occipital lobes with cytotoxic edema. No hemorrhagic transformation.  2) CTA head and neck  3) Echocardiogram  4) MRI Head  5) Continue ASA 81 mg daily  6) Lipid profile, Hgb A1c

## 2023-11-13 NOTE — H&P ADULT - NSHPPHYSICALEXAM_GEN_ALL_CORE
T(C): 36.8 (11-13-23 @ 13:30), Max: 36.8 (11-13-23 @ 13:30)  HR: 85 (11-13-23 @ 13:30) (60 - 85)  BP: 165/88 (11-13-23 @ 13:30) (165/88 - 179/88)  RR: 18 (11-13-23 @ 13:30) (16 - 18)  SpO2: 98% (11-13-23 @ 13:30) (98% - 99%)  Wt(kg): --Vital Signs Last 24 Hrs  T(C): 36.8 (13 Nov 2023 13:30), Max: 36.8 (13 Nov 2023 13:30)  T(F): 98.2 (13 Nov 2023 13:30), Max: 98.2 (13 Nov 2023 13:30)  HR: 85 (13 Nov 2023 13:30) (60 - 85)  BP: 165/88 (13 Nov 2023 13:30) (165/88 - 179/88)  BP(mean): --  RR: 18 (13 Nov 2023 13:30) (16 - 18)  SpO2: 98% (13 Nov 2023 13:30) (98% - 99%)    Parameters below as of 13 Nov 2023 13:30  Patient On (Oxygen Delivery Method): room air        PHYSICAL EXAM:  GENERAL: NAD  HENT:  Atraumatic, Normocephalic; No tonsillar erythema, exudates, or enlargement; Moist mucous membranes;   EYES: EOMI, PERRLA, conjunctiva and sclera clear, no lid-lag  NECK: Supple, No JVD, Normal thyroid  NERVOUS SYSTEM: Awake, alert, appropriate,  EOM intact, no nystagmus, VFF, CN II-XII intact, Motor tone and strength  RUE  5/5, LUE   5/5,  RLE  5/5,  LLE   5/5. a/o x 3  CHEST/LUNG: Clear to percussion bilaterally; No rales, rhonchi, wheezing, or rubs; normal respiratory effort, no intercostal retractions; No pitting edema  HEART: Regular rate and rhythm; No murmurs, rubs, or gallops  ABDOMEN: Soft, Nontender, Nondistended; Bowel sounds present; No HSM  MUSCULOSKELETAL/EXTREMITIES:  2+ Peripheral Pulses, No clubbing, or digital cyanosis  Examination of the joints, bones, and muscles of one or more of the following six areas:  1. head and neck 2. spine, ribs, and pelvis 3. right upper extremity 4. left upper extremity 5. right lower extremity 6. left lower extremity   ROM (pain, crepitation or contracture)  SKIN: No rashes or lesions; normal texture and temperature  PSYCH: Appropriate affect

## 2023-11-13 NOTE — ED ADULT NURSE NOTE - OBJECTIVE STATEMENT
Patient came from home with complaint of double vison in B/L eyes since Friday. Patient complaint that the vision started in left eye and moved to right eye. Denies any recent falls or trauma. Patient denies any nausea, vomit, dizziness, numbness or tingling.

## 2023-11-13 NOTE — ED PROVIDER NOTE - OBJECTIVE STATEMENT
Left eye double vision with both eyes opened after eating on this past Friday night, went to sleep, woke up Saturday morning and resolved, but right eye peripheral vision was blurry and has improved.  Pt saw Dr Stein this am and was referred to ED for ro tia.  Headache x 4 days intermittently, improved with Tylenol

## 2023-11-13 NOTE — H&P ADULT - NSHPREVIEWOFSYSTEMS_GEN_ALL_CORE
CONSTITUTIONAL: No fever, weight loss, or fatigue  EYES: No eye pain, visual disturbances, or discharge  ENMT:  No difficulty hearing, tinnitus, vertigo; No sinus or throat pain  NECK: No pain or stiffness  RESPIRATORY: No cough, wheezing, chills or hemoptysis; No shortness of breath  CARDIOVASCULAR: No chest pain, palpitations, dizziness, or leg swelling  GASTROINTESTINAL: No abdominal or epigastric pain. No nausea, vomiting, or hematemesis; No diarrhea or constipation. No melena or hematochezia.  GENITOURINARY: No dysuria, frequency, hematuria, or incontinence  NEUROLOGICAL: +headache, blurry vision  SKIN: No itching, burning, rashes, or lesions   MUSCULOSKELETAL: No joint pain or swelling; No muscle, back, or extremity pain  PSYCHIATRIC: No depression, anxiety, mood swings, or difficulty sleeping  ALLERGY AND IMMUNOLOGIC: No hives or eczema    ALL ROS REVIEWED AND NORMAL EXCEPT AS STATED ABOVE

## 2023-11-13 NOTE — H&P ADULT - ASSESSMENT
69yo female with PMHx as above admitted with acute/subacute CVA.    #CVA/TIA  -continue home aspirin 162mg daily  -lipitor 80 mg daily - (was on 40mg daily at home)  -Check lipid profile and A1C  -passed dysphagia screen, advanced diet  -DVT PPX with lovenox  -PT and OT evaluations  -SLP evaluation  -permissive HTN for 24 hours up to 220/110 (since no tPa given)  -gradual normotension after 24 hours (to goal BP < 140/90 assuming no major intracranial stenosis seen on CTA head/neck)  --will continue Toprol, hold HCTZ and ARB for now. likely can resume tomorrow  -Neuro checks q4 hours  -telemetry monitoring to r/o arrhythmia   -Will consider full dose A/C if any evidence of A-fib / A-flutter  -Possible PMR evaluation / pending clinical course  -Neuro consulted, check CTA head and neck, MRI brain non-con. continue aspirin. see consult note for full recs.  -Echocardiogram  -SCDs if unable to give chemical DVT ppx (if unable to, then why)    #GERD  -continue PPI    #HTN  -Toprol as above, hold ARB and HCTZ for now. likely can resume tomorrow  -monitor BP  -cardiac diet    d/w pt's family at bedside, all questions answered.    updated pt's PCP-Dr. Kurzweil of pt's admission.

## 2023-11-14 ENCOUNTER — APPOINTMENT (OUTPATIENT)
Dept: MRI IMAGING | Facility: HOSPITAL | Age: 68
End: 2023-11-14

## 2023-11-14 ENCOUNTER — TRANSCRIPTION ENCOUNTER (OUTPATIENT)
Age: 68
End: 2023-11-14

## 2023-11-14 DIAGNOSIS — E78.5 HYPERLIPIDEMIA, UNSPECIFIED: ICD-10-CM

## 2023-11-14 DIAGNOSIS — I48.91 UNSPECIFIED ATRIAL FIBRILLATION: ICD-10-CM

## 2023-11-14 DIAGNOSIS — I10 ESSENTIAL (PRIMARY) HYPERTENSION: ICD-10-CM

## 2023-11-14 DIAGNOSIS — I63.532 CEREBRAL INFARCTION DUE TO UNSPECIFIED OCCLUSION OR STENOSIS OF LEFT POSTERIOR CEREBRAL ARTERY: ICD-10-CM

## 2023-11-14 DIAGNOSIS — Z29.9 ENCOUNTER FOR PROPHYLACTIC MEASURES, UNSPECIFIED: ICD-10-CM

## 2023-11-14 LAB
A1C WITH ESTIMATED AVERAGE GLUCOSE RESULT: 5.8 % — HIGH (ref 4–5.6)
A1C WITH ESTIMATED AVERAGE GLUCOSE RESULT: 5.8 % — HIGH (ref 4–5.6)
ANION GAP SERPL CALC-SCNC: 10 MMOL/L — SIGNIFICANT CHANGE UP (ref 5–17)
ANION GAP SERPL CALC-SCNC: 10 MMOL/L — SIGNIFICANT CHANGE UP (ref 5–17)
BUN SERPL-MCNC: 24 MG/DL — HIGH (ref 7–23)
BUN SERPL-MCNC: 24 MG/DL — HIGH (ref 7–23)
CALCIUM SERPL-MCNC: 9.8 MG/DL — SIGNIFICANT CHANGE UP (ref 8.4–10.5)
CALCIUM SERPL-MCNC: 9.8 MG/DL — SIGNIFICANT CHANGE UP (ref 8.4–10.5)
CHLORIDE SERPL-SCNC: 98 MMOL/L — SIGNIFICANT CHANGE UP (ref 96–108)
CHLORIDE SERPL-SCNC: 98 MMOL/L — SIGNIFICANT CHANGE UP (ref 96–108)
CHOLEST SERPL-MCNC: 129 MG/DL — SIGNIFICANT CHANGE UP
CHOLEST SERPL-MCNC: 129 MG/DL — SIGNIFICANT CHANGE UP
CO2 SERPL-SCNC: 25 MMOL/L — SIGNIFICANT CHANGE UP (ref 22–31)
CO2 SERPL-SCNC: 25 MMOL/L — SIGNIFICANT CHANGE UP (ref 22–31)
CREAT SERPL-MCNC: 1.08 MG/DL — SIGNIFICANT CHANGE UP (ref 0.5–1.3)
CREAT SERPL-MCNC: 1.08 MG/DL — SIGNIFICANT CHANGE UP (ref 0.5–1.3)
EGFR: 56 ML/MIN/1.73M2 — LOW
EGFR: 56 ML/MIN/1.73M2 — LOW
ESTIMATED AVERAGE GLUCOSE: 120 MG/DL — HIGH (ref 68–114)
ESTIMATED AVERAGE GLUCOSE: 120 MG/DL — HIGH (ref 68–114)
GLUCOSE SERPL-MCNC: 84 MG/DL — SIGNIFICANT CHANGE UP (ref 70–99)
GLUCOSE SERPL-MCNC: 84 MG/DL — SIGNIFICANT CHANGE UP (ref 70–99)
HCT VFR BLD CALC: 41.8 % — SIGNIFICANT CHANGE UP (ref 34.5–45)
HCT VFR BLD CALC: 41.8 % — SIGNIFICANT CHANGE UP (ref 34.5–45)
HCV AB S/CO SERPL IA: 0.1 S/CO — SIGNIFICANT CHANGE UP (ref 0–0.99)
HCV AB S/CO SERPL IA: 0.1 S/CO — SIGNIFICANT CHANGE UP (ref 0–0.99)
HCV AB SERPL-IMP: SIGNIFICANT CHANGE UP
HCV AB SERPL-IMP: SIGNIFICANT CHANGE UP
HDLC SERPL-MCNC: 56 MG/DL — SIGNIFICANT CHANGE UP
HDLC SERPL-MCNC: 56 MG/DL — SIGNIFICANT CHANGE UP
HGB BLD-MCNC: 14 G/DL — SIGNIFICANT CHANGE UP (ref 11.5–15.5)
HGB BLD-MCNC: 14 G/DL — SIGNIFICANT CHANGE UP (ref 11.5–15.5)
LIPID PNL WITH DIRECT LDL SERPL: 56 MG/DL — SIGNIFICANT CHANGE UP
LIPID PNL WITH DIRECT LDL SERPL: 56 MG/DL — SIGNIFICANT CHANGE UP
MCHC RBC-ENTMCNC: 32.7 PG — SIGNIFICANT CHANGE UP (ref 27–34)
MCHC RBC-ENTMCNC: 32.7 PG — SIGNIFICANT CHANGE UP (ref 27–34)
MCHC RBC-ENTMCNC: 33.5 GM/DL — SIGNIFICANT CHANGE UP (ref 32–36)
MCHC RBC-ENTMCNC: 33.5 GM/DL — SIGNIFICANT CHANGE UP (ref 32–36)
MCV RBC AUTO: 97.7 FL — SIGNIFICANT CHANGE UP (ref 80–100)
MCV RBC AUTO: 97.7 FL — SIGNIFICANT CHANGE UP (ref 80–100)
NON HDL CHOLESTEROL: 73 MG/DL — SIGNIFICANT CHANGE UP
NON HDL CHOLESTEROL: 73 MG/DL — SIGNIFICANT CHANGE UP
NRBC # BLD: 0 /100 WBCS — SIGNIFICANT CHANGE UP (ref 0–0)
NRBC # BLD: 0 /100 WBCS — SIGNIFICANT CHANGE UP (ref 0–0)
PLATELET # BLD AUTO: 202 K/UL — SIGNIFICANT CHANGE UP (ref 150–400)
PLATELET # BLD AUTO: 202 K/UL — SIGNIFICANT CHANGE UP (ref 150–400)
POTASSIUM SERPL-MCNC: 3.8 MMOL/L — SIGNIFICANT CHANGE UP (ref 3.5–5.3)
POTASSIUM SERPL-MCNC: 3.8 MMOL/L — SIGNIFICANT CHANGE UP (ref 3.5–5.3)
POTASSIUM SERPL-SCNC: 3.8 MMOL/L — SIGNIFICANT CHANGE UP (ref 3.5–5.3)
POTASSIUM SERPL-SCNC: 3.8 MMOL/L — SIGNIFICANT CHANGE UP (ref 3.5–5.3)
RBC # BLD: 4.28 M/UL — SIGNIFICANT CHANGE UP (ref 3.8–5.2)
RBC # BLD: 4.28 M/UL — SIGNIFICANT CHANGE UP (ref 3.8–5.2)
RBC # FLD: 13 % — SIGNIFICANT CHANGE UP (ref 10.3–14.5)
RBC # FLD: 13 % — SIGNIFICANT CHANGE UP (ref 10.3–14.5)
SODIUM SERPL-SCNC: 133 MMOL/L — LOW (ref 135–145)
SODIUM SERPL-SCNC: 133 MMOL/L — LOW (ref 135–145)
TRIGL SERPL-MCNC: 89 MG/DL — SIGNIFICANT CHANGE UP
TRIGL SERPL-MCNC: 89 MG/DL — SIGNIFICANT CHANGE UP
WBC # BLD: 6.88 K/UL — SIGNIFICANT CHANGE UP (ref 3.8–10.5)
WBC # BLD: 6.88 K/UL — SIGNIFICANT CHANGE UP (ref 3.8–10.5)
WBC # FLD AUTO: 6.88 K/UL — SIGNIFICANT CHANGE UP (ref 3.8–10.5)
WBC # FLD AUTO: 6.88 K/UL — SIGNIFICANT CHANGE UP (ref 3.8–10.5)

## 2023-11-14 PROCEDURE — 99222 1ST HOSP IP/OBS MODERATE 55: CPT

## 2023-11-14 PROCEDURE — 70551 MRI BRAIN STEM W/O DYE: CPT | Mod: 26

## 2023-11-14 PROCEDURE — 93306 TTE W/DOPPLER COMPLETE: CPT | Mod: 26

## 2023-11-14 PROCEDURE — 93010 ELECTROCARDIOGRAM REPORT: CPT

## 2023-11-14 PROCEDURE — 99232 SBSQ HOSP IP/OBS MODERATE 35: CPT

## 2023-11-14 RX ORDER — CANDESARTAN CILEXETIL 8 MG/1
1 TABLET ORAL
Qty: 0 | Refills: 0 | DISCHARGE

## 2023-11-14 RX ORDER — VITAMIN E 100 UNIT
1 CAPSULE ORAL
Qty: 0 | Refills: 0 | DISCHARGE

## 2023-11-14 RX ORDER — APIXABAN 2.5 MG/1
1 TABLET, FILM COATED ORAL
Qty: 60 | Refills: 0
Start: 2023-11-14

## 2023-11-14 RX ORDER — APIXABAN 2.5 MG/1
5 TABLET, FILM COATED ORAL EVERY 12 HOURS
Refills: 0 | Status: DISCONTINUED | OUTPATIENT
Start: 2023-11-14 | End: 2023-11-15

## 2023-11-14 RX ORDER — METOPROLOL TARTRATE 50 MG
1 TABLET ORAL
Qty: 30 | Refills: 0
Start: 2023-11-14

## 2023-11-14 RX ORDER — METOPROLOL TARTRATE 50 MG
1 TABLET ORAL
Qty: 0 | Refills: 0 | DISCHARGE

## 2023-11-14 RX ORDER — APIXABAN 2.5 MG/1
10 TABLET, FILM COATED ORAL EVERY 12 HOURS
Refills: 0 | Status: DISCONTINUED | OUTPATIENT
Start: 2023-11-14 | End: 2023-11-14

## 2023-11-14 RX ORDER — APIXABAN 2.5 MG/1
1 TABLET, FILM COATED ORAL
Qty: 0 | Refills: 0 | DISCHARGE
Start: 2023-11-14

## 2023-11-14 RX ORDER — ATORVASTATIN CALCIUM 80 MG/1
1 TABLET, FILM COATED ORAL
Qty: 30 | Refills: 0
Start: 2023-11-14

## 2023-11-14 RX ORDER — APIXABAN 2.5 MG/1
2 TABLET, FILM COATED ORAL
Qty: 74 | Refills: 0
Start: 2023-11-14

## 2023-11-14 RX ADMIN — Medication 650 MILLIGRAM(S): at 00:37

## 2023-11-14 RX ADMIN — Medication 650 MILLIGRAM(S): at 23:00

## 2023-11-14 RX ADMIN — Medication 650 MILLIGRAM(S): at 01:30

## 2023-11-14 RX ADMIN — ATORVASTATIN CALCIUM 80 MILLIGRAM(S): 80 TABLET, FILM COATED ORAL at 22:56

## 2023-11-14 RX ADMIN — APIXABAN 5 MILLIGRAM(S): 2.5 TABLET, FILM COATED ORAL at 18:38

## 2023-11-14 RX ADMIN — Medication 650 MILLIGRAM(S): at 22:55

## 2023-11-14 RX ADMIN — Medication 650 MILLIGRAM(S): at 07:30

## 2023-11-14 RX ADMIN — Medication 50 MILLIGRAM(S): at 05:53

## 2023-11-14 RX ADMIN — Medication 650 MILLIGRAM(S): at 17:58

## 2023-11-14 RX ADMIN — PANTOPRAZOLE SODIUM 40 MILLIGRAM(S): 20 TABLET, DELAYED RELEASE ORAL at 05:53

## 2023-11-14 RX ADMIN — Medication 650 MILLIGRAM(S): at 06:43

## 2023-11-14 RX ADMIN — Medication 650 MILLIGRAM(S): at 16:55

## 2023-11-14 RX ADMIN — ENOXAPARIN SODIUM 40 MILLIGRAM(S): 100 INJECTION SUBCUTANEOUS at 06:42

## 2023-11-14 NOTE — SWALLOW BEDSIDE ASSESSMENT ADULT - COMMENTS
WBC: 6.88  CXR (11/13/23): No acute pulmonary disease.  CTH (11/13/23): Acute/subacute left-sided PCA distribution infarction   affecting the left posteromedial temporal and left paramedian occipital   lobes with associated cytotoxic edema. No hemorrhagic transformation.

## 2023-11-14 NOTE — SWALLOW BEDSIDE ASSESSMENT ADULT - SWALLOW EVAL: DIAGNOSIS
Patient presents with a functional oropharyngeal swallow. Denies dysphagia/odynophagia. Oral motor exam and cranial nerve exam unremarkable. Oral phase marked by adequate oral grading, containment, mastication timeliness/effectiveness and clearance. Suspect adequate anterior-posterior transport. Pharyngeal motor response noted via palpation. No overt s/sx of aspiration observed. Recommend continue on regular solids/thin liquids

## 2023-11-14 NOTE — CONSULT NOTE ADULT - ASSESSMENT
Assessment: 68-year-old female with PMHx CREST syndrome, HLD, HTN, gerd admitted for visual disturbance found to have a CVA. Cardiology consulted for afib iso cva. Pt endorses feeling palpitations earlier today, no sob. also endorses pain in the epigastric/ chest area that came on after eating spicy soup. non radiating. Admission EKG with NSR. CTA HN with normal cta of the neck but occlusion of the distal portion of the left posterior cerebral artery. MRI confirming an acute left PCA infarct.    Recommendations:  Afib: please obtain recent EKG and place pt on tele. TTE done with EF 65-70% and irregular heart rhythm. Agree with eliquis. Can continue with asa, statin and bb. Chest pain likely GI related as its onset was post meal and pt is usually on omeprazole but has not taken it for a few days.  Cardiology will continue to follow    Radha UMANA-BC       Assessment: 68-year-old female with PMHx CREST syndrome, HLD, HTN, gerd admitted for visual disturbance found to have a CVA. Cardiology consulted for afib iso cva. Pt endorses feeling palpitations earlier today, no sob. also endorses pain in the epigastric/ chest area that came on after eating spicy soup. non radiating. Admission EKG with NSR. CTA HN with normal cta of the neck but occlusion of the distal portion of the left posterior cerebral artery. MRI confirming an acute left PCA infarct.    Recommendations:  Afib: please obtain recent EKG and place pt on tele. TTE done with EF 65-70% and irregular heart rhythm. Agree with eliquis. Can continue with asa, statin and bb. Chest pain likely GI related as its onset was post meal and pt is usually on omeprazole but has not taken it for a few days.  Cardiology will continue to follow    Pt cardiologist: Dr Naty Millan [facility]       Assessment: 68-year-old female with PMHx CREST syndrome, HLD, HTN, gerd admitted for visual disturbance found to have a CVA. Cardiology consulted for afib iso cva. Pt endorses feeling palpitations earlier today, no sob. also endorses pain in the epigastric/ chest area that came on after eating spicy soup. non radiating. Admission EKG with NSR. CTA HN with normal cta of the neck but occlusion of the distal portion of the left posterior cerebral artery. MRI confirming an acute left PCA infarct.    Recommendations:  Afib: please obtain recent EKG and place pt on tele. TTE done with EF 65-70% and irregular heart rhythm. Agree with eliquis if neuro agrees. Can continue with asa, statin and bb. Chest pain likely GI related as its onset was post meal and pt is usually on omeprazole but has not taken it for a few days.  Cardiology will continue to follow    Pt cardiologist: Dr Naty Millan [facility]       Assessment: 68-year-old female with PMHx CREST syndrome, HLD, HTN, GERD admitted for visual disturbance found to have a CVA. Cardiology consulted for Atrial fibrillation in setting of CVA. Patient endorses feeling palpitations earlier today, no shortness of breath. She also endorses pain in the epigastric/ chest area that came on after eating spicy soup, non-radiating. Admission EKG with NSR. CTA of the neck with occlusion of the distal portion of the left posterior cerebral artery. MRI confirming an acute left PCA infarct.    Recommendations:  Afib: please obtain recent EKG and place pt on tele. TTE done with EF 65-70% and irregular heart rhythm. Agree with Eliquis if deemed safe to start per Neurology. Can continue statin and beta blocker. Chest pain likely GI related as its onset was post meal and patient is usually on omeprazole but has not taken it for a few days.  Cardiology will continue to follow    Pt cardiologist: Dr Naty ISLASCNP-BC

## 2023-11-14 NOTE — DISCHARGE NOTE PROVIDER - NSDCHC_MEDRECSTATUS_GEN_ALL_CORE
Admission Reconciliation is Not Complete  Discharge Reconciliation is Not Complete Admission Reconciliation is Not Complete  Discharge Reconciliation is Completed No

## 2023-11-14 NOTE — DISCHARGE NOTE PROVIDER - HOSPITAL COURSE
Patient is a 68-year-old woman with PMHx of CREST syndrome, HTN, HLD admitted for visual disturbances found to have a Left PCA CVA. Imaging studies were completed with results listed below. She was on aspirin prior to arrival, 162mg, which was continued until 11/14 when eliquis 5mg BID was started per recommendation of Cardiology and Neurology. Her statin was increased from 40mg to 80mg QHS. She passed her dysphagia screening and was seen by PT, OT and Speech with no needs identified. Her home medications of HCTZ and ARB were held, with continuation of metoprolol to allow for permissive HTN for 24hrs with gradual return to normotension. She was noted to have an episode of afib 11/13 ~6pm, and was put on eliquis 5mg BID per Cardiology's recommendation as her [id number] was a 3.     CONSULTS: Cardiology, Case Management, Neurology, Occupational Therapy, Physical Therapy, and Speech Pathology  IMAGING:  TTE Echo Complete w/o Contrast w/ Doppler (11.14.23 @ 08:20)   Summary:   1. The heart rhythm is irregular throughout the study.   2. Normal global left ventricular systolic function.   3. Left ventricular ejection fraction, by visual estimation, is 65 to   70%.   4. Increased LV wall thickness.   5. Normal left ventricular internal cavity size.   6. Left ventricle chordal calcification.   7. The mitral in-flow pattern reveals no discernable A-wave, therefore   no comment on diastolic function can be made.   8. Normal right ventricular size and function.   9. The left atrium is normal in size.  10. The right atrium is normal in size.  11. Mild thickening of the anterior mitral valve leaflet.  12. Trace mitral valve regurgitation.  13. Sclerotic aortic valve with normal opening.  14. No aortic valve stenosis.  15. There is no evidence of pericardial effusion.    CT Head No Cont (11.13.23 @ 13:44)   IMPRESSION: Acute/subacute left-sided PCA distribution infarction   affecting the left posteromedial temporal and left paramedian occipital   lobes with associated cytotoxic edema. No hemorrhagic transformation.    CT Angio Head w/ IV Cont (11.13.23 @ 16:44)   IMPRESSION: Normal CTA of the neck. Occlusion of the distal calcarine   portion of the left posterior cerebral artery.    Xray Chest 1 View- PORTABLE-Urgent (11.13.23 @ 14:39)   Impression:  No acute pulmonary disease.    MR Head No Cont (11.14.23 @ 13:41)   IMPRESSION: Acute left PCA infarct.      VITALS:    PHYSICAL EXAM:         Patient is a 68-year-old woman with PMHx of CREST syndrome, HTN, HLD admitted for visual disturbances found to have a Left PCA CVA. Imaging studies were completed with results listed below. She was on aspirin prior to arrival, 162mg, which was continued until 11/14 when eliquis 5mg BID was started per recommendation of Cardiology and Neurology. Her statin was increased from 40mg to 80mg QHS. She passed her dysphagia screening and was seen by PT, OT and Speech with no needs identified. Her home medications of HCTZ and ARB were held, with continuation of metoprolol to allow for permissive HTN for 24hrs with gradual return to normotension. She was noted to have an episode of afib 11/13 ~6pm, and was put on eliquis 5mg BID per Cardiology's recommendation as her [id number] was a 3. She can continue her home medications with exception of aspirin and HCTZ. HCTZ was replaced with amlodipine 5mg QD.    CONSULTS: Cardiology, Case Management, Neurology, Occupational Therapy, Physical Therapy, and Speech Pathology  IMAGING:  CT Head No Cont (11.15.23 @ 10:29)   IMPRESSION: Stable exam.  TTE Echo Complete w/o Contrast w/ Doppler (11.14.23 @ 08:20)   Summary:   1. The heart rhythm is irregular throughout the study.   2. Normal global left ventricular systolic function.   3. Left ventricular ejection fraction, by visual estimation, is 65 to   70%.   4. Increased LV wall thickness.   5. Normal left ventricular internal cavity size.   6. Left ventricle chordal calcification.   7. The mitral in-flow pattern reveals no discernable A-wave, therefore   no comment on diastolic function can be made.   8. Normal right ventricular size and function.   9. The left atrium is normal in size.  10. The right atrium is normal in size.  11. Mild thickening of the anterior mitral valve leaflet.  12. Trace mitral valve regurgitation.  13. Sclerotic aortic valve with normal opening.  14. No aortic valve stenosis.  15. There is no evidence of pericardial effusion.    CT Head No Cont (11.13.23 @ 13:44)   IMPRESSION: Acute/subacute left-sided PCA distribution infarction   affecting the left posteromedial temporal and left paramedian occipital   lobes with associated cytotoxic edema. No hemorrhagic transformation.    CT Angio Head w/ IV Cont (11.13.23 @ 16:44)   IMPRESSION: Normal CTA of the neck. Occlusion of the distal calcarine   portion of the left posterior cerebral artery.    Xray Chest 1 View- PORTABLE-Urgent (11.13.23 @ 14:39)   Impression:  No acute pulmonary disease.    MR Head No Cont (11.14.23 @ 13:41)   IMPRESSION: Acute left PCA infarct.      VITALS:    PHYSICAL EXAM:         Patient is a 68-year-old woman with PMHx of CREST syndrome, HTN, HLD admitted for visual disturbances found to have a Left PCA CVA. Imaging studies were completed with results listed below. She was on aspirin prior to arrival, 162mg, which was continued until 11/14 when eliquis 5mg BID was started per recommendation of Cardiology and Neurology. Her statin was increased from 40mg to 80mg QHS. She passed her dysphagia screening and was seen by PT, OT and Speech with no needs identified. Her home medications of HCTZ and ARB were held, with continuation of metoprolol to allow for permissive HTN for 24hrs with gradual return to normotension. She was noted to have an episode of afib 11/13 ~6pm, and was put on eliquis 5mg BID per Cardiology's recommendation as her [id number] was a 3. She can continue her home medications with exception of aspirin and HCTZ. HCTZ was replaced with amlodipine 5mg QD. Patient aware to keep a BP log until F/U with PCP. Patient noted to be in sinus rhythm again this morning 11/15/23.     CONSULTS: Cardiology, Case Management, Neurology, Occupational Therapy, Physical Therapy, and Speech Pathology  IMAGING:  CT Head No Cont (11.15.23 @ 10:29)   IMPRESSION: Stable exam.  TTE Echo Complete w/o Contrast w/ Doppler (11.14.23 @ 08:20)   Summary:   1. The heart rhythm is irregular throughout the study.   2. Normal global left ventricular systolic function.   3. Left ventricular ejection fraction, by visual estimation, is 65 to   70%.   4. Increased LV wall thickness.   5. Normal left ventricular internal cavity size.   6. Left ventricle chordal calcification.   7. The mitral in-flow pattern reveals no discernable A-wave, therefore   no comment on diastolic function can be made.   8. Normal right ventricular size and function.   9. The left atrium is normal in size.  10. The right atrium is normal in size.  11. Mild thickening of the anterior mitral valve leaflet.  12. Trace mitral valve regurgitation.  13. Sclerotic aortic valve with normal opening.  14. No aortic valve stenosis.  15. There is no evidence of pericardial effusion.    CT Head No Cont (11.13.23 @ 13:44)   IMPRESSION: Acute/subacute left-sided PCA distribution infarction   affecting the left posteromedial temporal and left paramedian occipital   lobes with associated cytotoxic edema. No hemorrhagic transformation.    CT Angio Head w/ IV Cont (11.13.23 @ 16:44)   IMPRESSION: Normal CTA of the neck. Occlusion of the distal calcarine   portion of the left posterior cerebral artery.    Xray Chest 1 View- PORTABLE-Urgent (11.13.23 @ 14:39)   Impression:  No acute pulmonary disease.    MR Head No Cont (11.14.23 @ 13:41)   IMPRESSION: Acute left PCA infarct.      VITALS:  Vital Signs Last 24 Hrs  T(C): 36.6 (15 Nov 2023 11:43), Max: 36.6 (14 Nov 2023 21:46)  T(F): 97.9 (15 Nov 2023 11:43), Max: 97.9 (14 Nov 2023 21:46)  HR: 68 (15 Nov 2023 11:43) (56 - 68)  BP: 143/79 (15 Nov 2023 11:43) (143/79 - 178/74)  BP(mean): --  RR: 18 (15 Nov 2023 11:43) (16 - 18)  SpO2: 97% (15 Nov 2023 11:43) (97% - 99%)    Parameters below as of 15 Nov 2023 11:43  Patient On (Oxygen Delivery Method): room air      PHYSICAL EXAM:  GENERAL: NAD, lying in bed comfortably  HEAD:  Atraumatic, normocephalic  EYES: EOMI, conjunctiva and sclera clear  NECK: Supple, trachea midline, no JVD  HEART: Regular rate and rhythm, no murmurs, rubs, or gallops  LUNGS: Unlabored respirations.  Clear to auscultation bilaterally, no crackles, wheezing, or rhonchi  ABDOMEN: Soft, nontender, nondistended, +BS  EXTREMITIES: 2+ peripheral pulses bilaterally. No clubbing, cyanosis, or edema  NERVOUS SYSTEM:  A&Ox3, moving all extremities, no focal deficits   SKIN: No rashes or lesions

## 2023-11-14 NOTE — DISCHARGE NOTE PROVIDER - NSDCFUSCHEDAPPT_GEN_ALL_CORE_FT
[facility] ParedesFormerly [facility]  NEUROLOGY [address] [facility]  Scheduled Appointment: 11/20/2023

## 2023-11-14 NOTE — DISCHARGE NOTE PROVIDER - CARE PROVIDER_API CALL
Kurzweil, Peter J.  Internal Medicine  [address] [place] [address]  Phone: [phone]  Fax: [phone]  Established Patient  Follow Up Time: 1 week   Kurzweil, Peter J.  Internal Medicine  [address] [place] [address]  Phone: [phone]  Fax: [phone]  Established Patient  Follow Up Time: 1 week    Alfred Thornton  Phone: [phone]  Fax: (   )    -  Follow Up Time:

## 2023-11-14 NOTE — DISCHARGE NOTE PROVIDER - PROVIDER TOKENS
PROVIDER:[TOKEN:[[id number]],FOLLOWUP:[1 week],ESTABLISHEDPATIENT:[T]] PROVIDER:[TOKEN:[[id number]],FOLLOWUP:[1 week],ESTABLISHEDPATIENT:[T]],FREE:[LAST:[Hillary],FIRST:[Alfred],PHONE:[phone]],FAX:[(   )    -]]

## 2023-11-14 NOTE — DISCHARGE NOTE PROVIDER - NSDCCPCAREPLAN_GEN_ALL_CORE_FT
PRINCIPAL DISCHARGE DIAGNOSIS  Diagnosis: CVA (cerebrovascular accident)  Assessment and Plan of Treatment: You were hospitalized for a cerebrovascular accident, also known as a stroke. A stroke is damage to the brain that occurs when a blood vessel in the brain bursts or is blocked by a blood clot. Without blood and the oxygen it carries, part of the brain is damaged. The part of your body controlled by that part of your brain may not function properly now. You were noted to have a stroke of the left posterior cerebral artery likely secondary to: atrial fibrillation.  Have a heart-healthy lifestyle.  Don't smoke and avoid second-hand smoke.  If you drink alcohol, try to drink less. Your risk of harm from alcohol is low if you have 2 drinks or less per week. Work with your doctor to find what is right for you.  Stay at a healthy weight. Lose weight if you need to.  Be active. Ask your doctor what type and level of activity is safe for you.  Eat heart-healthy foods. These include vegetables, fruits, nuts, beans, lean meat, fish, and whole grains. Limit sodium and sugar.  Call 911 or seek emergency care if:  You have signs of another stroke. These may include:  Sudden numbness, tingling, weakness, or loss of movement in your face, arm, or leg, especially on only one side of your body.  Sudden vision changes.  Sudden trouble speaking.  Sudden confusion or trouble understanding simple statements.  Sudden problems with walking or balance.  A sudden, severe headache that is different from past headaches.  Fainting.  A seizure.  New medications on discharge:   Eliquis (apixaban) 5mg, Take 1 pill by mouth twice a day  Atorvastatin 80mg, Take 1 pill by mouth at bedtime   Please follow-up with your cardiologist in 3-4 weeks for your atrial fibrillation and primary care provider in 1 week for optimization of the rest of your medical conditions and for proper care management.        SECONDARY DISCHARGE DIAGNOSES  Diagnosis: Afib  Assessment and Plan of Treatment: Given your risk factors, you were started on an anticoagulant (blood thinner), Eliquis 5mg, you are to take 1 pill two times a day.   You may bleed and bruise more easily while you are using this medicine. Be extra careful to avoid injuries. Stay away from rough sports or other situations where you could be bruised, cut, or injured. Gently brush and floss your teeth. Be careful when using sharp objects, including razors and fingernail clippers. Avoid picking your nose. If you need to blow your nose, blow it gently.  Check with your doctor right away if you have any unusual bleeding or bruising, black, tarry stools, blood in the urine or stools, headache, dizziness, or weakness, pain, swelling, or discomfort in a joint, pinpoint red spots on your skin, unusual nosebleeds, or unusual vaginal bleeding that is heavier than normal. These may be signs of bleeding problems.  Please follow-up with your cardiologist in 3-4 weeks for your atrial fibrillation and primary care provider in 1 week for optimization of the rest of your medical conditions and for proper care management.     PRINCIPAL DISCHARGE DIAGNOSIS  Diagnosis: CVA (cerebrovascular accident)  Assessment and Plan of Treatment: You were hospitalized for a cerebrovascular accident, also known as a stroke. A stroke is damage to the brain that occurs when a blood vessel in the brain bursts or is blocked by a blood clot. Without blood and the oxygen it carries, part of the brain is damaged. The part of your body controlled by that part of your brain may not function properly now. You were noted to have a stroke of the left posterior cerebral artery likely secondary to: atrial fibrillation.  Have a heart-healthy lifestyle.  Don't smoke and avoid second-hand smoke.  If you drink alcohol, try to drink less. Your risk of harm from alcohol is low if you have 2 drinks or less per week. Work with your doctor to find what is right for you.  Stay at a healthy weight. Lose weight if you need to.  Be active. Ask your doctor what type and level of activity is safe for you.  Eat heart-healthy foods. These include vegetables, fruits, nuts, beans, lean meat, fish, and whole grains. Limit sodium and sugar.  Call 911 or seek emergency care if:  You have signs of another stroke. These may include:  Sudden numbness, tingling, weakness, or loss of movement in your face, arm, or leg, especially on only one side of your body.  Sudden vision changes.  Sudden trouble speaking.  Sudden confusion or trouble understanding simple statements.  Sudden problems with walking or balance.  A sudden, severe headache that is different from past headaches.  Fainting.  A seizure.  New medications on discharge:   Eliquis (apixaban) 5mg, Take 1 pill by mouth twice a day  Atorvastatin 40mg, Take 1 pill by mouth at bedtime   Please follow-up with your cardiologist in 3-4 weeks for your atrial fibrillation and primary care provider in 1 week for optimization of the rest of your medical conditions and for proper care management.        SECONDARY DISCHARGE DIAGNOSES  Diagnosis: Afib  Assessment and Plan of Treatment: Given your risk factors, you were started on an anticoagulant (blood thinner), Eliquis 5mg, you are to take 1 pill two times a day.   You may bleed and bruise more easily while you are using this medicine. Be extra careful to avoid injuries. Stay away from rough sports or other situations where you could be bruised, cut, or injured. Gently brush and floss your teeth. Be careful when using sharp objects, including razors and fingernail clippers. Avoid picking your nose. If you need to blow your nose, blow it gently.  Check with your doctor right away if you have any unusual bleeding or bruising, black, tarry stools, blood in the urine or stools, headache, dizziness, or weakness, pain, swelling, or discomfort in a joint, pinpoint red spots on your skin, unusual nosebleeds, or unusual vaginal bleeding that is heavier than normal. These may be signs of bleeding problems.  Please follow-up with your cardiologist in 3-4 weeks for your atrial fibrillation and primary care provider in 1 week for optimization of the rest of your medical conditions and for proper care management.     PRINCIPAL DISCHARGE DIAGNOSIS  Diagnosis: CVA (cerebrovascular accident)  Assessment and Plan of Treatment: You were hospitalized for a cerebrovascular accident, also known as a stroke. A stroke is damage to the brain that occurs when a blood vessel in the brain bursts or is blocked by a blood clot. Without blood and the oxygen it carries, part of the brain is damaged. The part of your body controlled by that part of your brain may not function properly now. You were noted to have a stroke of the left posterior cerebral artery likely secondary to: atrial fibrillation.  Have a heart-healthy lifestyle.  Don't smoke and avoid second-hand smoke.  If you drink alcohol, try to drink less. Your risk of harm from alcohol is low if you have 2 drinks or less per week. Work with your doctor to find what is right for you.  Stay at a healthy weight. Lose weight if you need to.  Be active. Ask your doctor what type and level of activity is safe for you.  Eat heart-healthy foods. These include vegetables, fruits, nuts, beans, lean meat, fish, and whole grains. Limit sodium and sugar.  Call 911 or seek emergency care if:  You have signs of another stroke. These may include:  Sudden numbness, tingling, weakness, or loss of movement in your face, arm, or leg, especially on only one side of your body.  Sudden vision changes.  Sudden trouble speaking.  Sudden confusion or trouble understanding simple statements.  Sudden problems with walking or balance.  A sudden, severe headache that is different from past headaches.  Fainting.  A seizure.  New medications on discharge:   Eliquis (apixaban) 5mg, Take 1 pill by mouth twice a day  Atorvastatin 40mg, Take 1 pill by mouth at bedtime   Please follow-up with your cardiologist in 3-4 weeks for your atrial fibrillation and primary care provider in 1 week for optimization of the rest of your medical conditions and for proper care management.        SECONDARY DISCHARGE DIAGNOSES  Diagnosis: HTN (hypertension)  Assessment and Plan of Treatment: You were noted to have high blood pressure during your hospitalization.   Please STOP hydrochlorothiazide 25mg.  Please START amlodipine 5mg, take 1 tablet daily.   Please keep a blood pressure log to discuss with your primary care provider at your next appointment.    Diagnosis: Afib  Assessment and Plan of Treatment: Given your risk factors, you were started on an anticoagulant (blood thinner), Eliquis 5mg, you are to take 1 pill two times a day.   You may bleed and bruise more easily while you are using this medicine. Be extra careful to avoid injuries. Stay away from rough sports or other situations where you could be bruised, cut, or injured. Gently brush and floss your teeth. Be careful when using sharp objects, including razors and fingernail clippers. Avoid picking your nose. If you need to blow your nose, blow it gently.  Check with your doctor right away if you have any unusual bleeding or bruising, black, tarry stools, blood in the urine or stools, headache, dizziness, or weakness, pain, swelling, or discomfort in a joint, pinpoint red spots on your skin, unusual nosebleeds, or unusual vaginal bleeding that is heavier than normal. These may be signs of bleeding problems.  Please follow-up with your cardiologist in 3-4 weeks for your atrial fibrillation and primary care provider in 1 week for optimization of the rest of your medical conditions and for proper care management.

## 2023-11-14 NOTE — SPEECH LANGUAGE PATHOLOGY EVALUATION - SLP CRITERIA FOR SKILLED THERAPEUTIC INTERVENTIONS MET
no problems identified which require skilled intervention/current level of function same as previous level of function

## 2023-11-14 NOTE — DISCHARGE NOTE PROVIDER - NSDCCAREPROVSEEN_GEN_ALL_CORE_FT
Stalin, Benjie Gomez, Freda Hammonds, Viri Hastings, Colin Smith, [facility]  Mitchell, Mitchell Turner, Martita Hairston, Chelsey Corral, Josh Alonzo, Gustavo Millan, Ma. Asim Hassan

## 2023-11-14 NOTE — DISCHARGE NOTE PROVIDER - NSDCMRMEDTOKEN_GEN_ALL_CORE_FT
apixaban 5 mg oral tablet: 1 tab(s) orally every 12 hours  aspirin 81 mg oral capsule: 2 cap(s) orally once a day  atorvastatin 40 mg oral tablet: 1 tab(s) orally once a day (at bedtime)  atorvastatin 80 mg oral tablet: 1 tab(s) orally once a day (at bedtime)  candesartan 16 mg oral tablet: 1 tab(s) orally once a day  hydroCHLOROthiazide 25 mg oral tablet: 1 tab(s) orally once a day  metoprolol succinate 50 mg oral tablet, extended release: 1 tab(s) orally once a day  omeprazole 40 mg oral delayed release capsule: 1 cap(s) orally once a day  Zetia 10 mg oral tablet: 1 tab(s) orally once a day   apixaban 5 mg oral tablet: 1 tab(s) orally every 12 hours  atorvastatin 40 mg oral tablet: 1 tab(s) orally once a day (at bedtime)  candesartan 16 mg oral tablet: 1 tab(s) orally once a day  metoprolol succinate 50 mg oral tablet, extended release: 1 tab(s) orally once a day  Norvasc 5 mg oral tablet: 1 tab(s) orally once a day  omeprazole 40 mg oral delayed release capsule: 1 cap(s) orally once a day  Zetia 10 mg oral tablet: 1 tab(s) orally once a day

## 2023-11-14 NOTE — DISCHARGE NOTE PROVIDER - NSDCQMSTROKERISK_NEU_ALL_CORE
Atrial fibrillation/High blood pressure/High cholesterol/History of a stroke or TIA Atrial fibrillation/High blood pressure/High cholesterol/History of a stroke or TIA/Obesity

## 2023-11-14 NOTE — SWALLOW BEDSIDE ASSESSMENT ADULT - SLP PERTINENT HISTORY OF CURRENT PROBLEM
Patient presented on 11/13 with blurred vision. She states friday,11/10/23 she noted double vision with images vertical. This resolved and the next day, she noted blurring of the far right visual field. She notes an intermittent mild frontal headache which resolves with tylenol. She denies other associated neurological complaints. She denies fever chest pain, trauma. She states she has continued to improve with minimal blurring of the far right visual field. She saw an ophthalmalogist, Dr. Guzman, and referred to hospital for stroke evaluation. Neurological exam as above. Would be concerned with acute cerebral ischemia in the area seen on CT Head, Left PCA distribution.

## 2023-11-14 NOTE — PHYSICAL THERAPY INITIAL EVALUATION ADULT - ADDITIONAL COMMENTS
pt lives w/spouse in private house, 4 long w/1 rail, 1 flt to br. pt is independent w/ambulation and ADL's, +

## 2023-11-14 NOTE — PROGRESS NOTE ADULT - ASSESSMENT
Patient is a 68 year old woman admitted today, 11/13/23.  She states  friday, 11/10/23 she noted  double vision with images vertical. This resolved and the next day, she noted blurring of the far right visual field. She notes an intermittent mild frontal headache which resolves with tylenol. She denies other associated neurological complaints. She denies fever chest pain, trauma. She states she has continued to improve with minimal blurring of the far right visual field. She saw an ophthalmalogist, Dr. Guzman, and referred to hospital for stroke evaluation. Neurological exam as above. Would be concerned with acute cerebral ischemia in the area seen on CT Head, Left PCA distribution.    1) CT head as above acute/subacute PCA distribution infarction left posteromedial temporal and left paramedian occipital lobes with cytotoxic edema. No hemorrhagic transformation.  2) CTA head and neck as above occlusion of the distal portion of the left PCA. Normal CTA neck  3) Echocardiogram as above without thrombus  4) MRI Head as above abnormal  T2 prolongation and restricted diffusion posterior left temporal and medical occipital cortex compatible with acute left PCA infarcts. No hemorrhagic transformation  5) Telemetry today noted atrial fibrillation. Appreciate Cardiology consult. Agree with anticoagulation with eliquis for stroke prophylaxis. Would not continue ASA in addition to eliquis for stroke prophylaxis.  6) CT Head no contrast tomorrow, 11/15/23

## 2023-11-14 NOTE — DISCHARGE NOTE PROVIDER - NSDCFUADDAPPT_GEN_ALL_CORE_FT
Stroke Neurology. 1 [facility], [phone], November 20, 4 PM, Tita Paredes NP.

## 2023-11-14 NOTE — CONSULT NOTE ADULT - SUBJECTIVE AND OBJECTIVE BOX
ANAMIKA PAULAKEVIN  [id number]      HPI:  68-year-old female with PMHx CREST syndrome, HLD, HTN admitted for visual disturbance found to have a CVA. presenting with blurred vision. On friday, after dinner, started having double vision (vertically). This improved and on Saturday, was having blurred vision of the outer half of her right eye. This gradually improved over the weekend. Was seen today by her optho and was sent to ED to r/o cva/tia. Upon admission, she is still with slight blurring of her outer right visual field. Denies any cp, palpitations, dizziness, syncope or other complaints.     Family at bedside, all questions answered.  (2023 14:21)      ALLERGIES:  No Known Allergies      PAST MEDICAL & SURGICAL HISTORY:  HTN (hypertension)      HLD (hyperlipidemia)      Calcinosis, Raynaud's phenomenon, sclerodactyly, and telangiectasia (CREST) syndrome      HNP (herniated nucleus pulposus), lumbar      History of pleural effusion  right  ; resolved      Leg swelling      Heart murmur      H/O  section        S/P cervical discectomy              CURRENT MEDICATIONS:  MEDICATIONS  (STANDING):  apixaban 5 milliGRAM(s) Oral every 12 hours  aspirin enteric coated 162 milliGRAM(s) Oral daily  atorvastatin 80 milliGRAM(s) Oral at bedtime  metoprolol succinate ER 50 milliGRAM(s) Oral daily  multivitamin/minerals/iron Oral Solution (CENTRUM) 15 milliLiter(s) Oral daily  pantoprazole    Tablet 40 milliGRAM(s) Oral before breakfast    MEDICATIONS  (PRN):  acetaminophen     Tablet .. 650 milliGRAM(s) Oral every 6 hours PRN Mild Pain (1 - 3), Moderate Pain (4 - 6), Severe Pain (7 - 10)  aluminum hydroxide/magnesium hydroxide/simethicone Suspension 30 milliLiter(s) Oral every 4 hours PRN Dyspepsia  ondansetron Injectable 4 milliGRAM(s) IV Push every 8 hours PRN Nausea and/or Vomiting      SOCIAL HISTORY:      FAMILY HISTORY:  Family history of diabetes mellitus in sister    FHx: heart disease  parents    Family history of abdominal aortic aneurysm (AAA)  mother      ROS:  All 10 systems reviewed and positives noted in HPI    OBJECTIVE:    VITAL SIGNS:  Vital Signs Last 24 Hrs  T(C): 36.2 (2023 11:18), Max: 36.6 (2023 20:55)  T(F): 97.2 (2023 11:18), Max: 97.8 (2023 20:55)  HR: 101 (2023 11:18) (62 - 101)  BP: 125/94 (2023 11:18) (125/94 - 164/66)  BP(mean): --  RR: 16 (2023 11:18) (16 - 18)  SpO2: 96% (2023 11:18) (96% - 97%)    Parameters below as of 2023 11:18  Patient On (Oxygen Delivery Method): room air      PHYSICAL EXAM:  General: well appearing, no distress  HEENT: sclera anicteric  Neck: supple, no carotid bruits b/l  CVS: JVP ~ 7 cm H20, RRR, s1, s2, no murmurs/rubs/gallops  Chest: unlabored respirations, clear to auscultation b/l  Abdomen: non-distended  Extremities: no lower extremity edema b/l  Neuro: awake, alert & oriented x 3  Psych: normal affect      LABS:                        14.0   6.88  )-----------( 202      ( 2023 06:21 )             41.8     11-14    133<L>  |  98  |  24<H>  ----------------------------<  84  3.8   |  25  |  1.08    Ca    9.8      2023 06:21    TPro  8.4<H>  /  Alb  3.9  /  TBili  0.5  /  DBili  x   /  AST  35  /  ALT  25  /  AlkPhos  84  11-13        PT/INR - ( 2023 14:20 )   PT: 11.9 sec;   INR: 1.02 ratio         PTT - ( 2023 14:20 )  PTT:36.9 sec      ECG: NSR    TTE: < from: TTE Echo Complete w/o Contrast w/ Doppler (23 @ 08:20) >   1. The heart rhythm is irregular throughout the study.   2. Normal global left ventricular systolic function.   3. Left ventricular ejection fraction, by visual estimation, is 65 to   70%.   4. Increased LV wall thickness.   5. Normal left ventricular internal cavity size.   6. Left ventricle chordal calcification.   7. The mitral in-flow pattern reveals no discernable A-wave, therefore   no comment on diastolic function can be made.   8. Normal right ventricular size and function.   9. The left atrium is normal in size.  10. The right atrium is normal in size.  11. Mild thickening of the anterior mitral valve leaflet.  12. Trace mitral valve regurgitation.  13. Sclerotic aortic valve with normal opening.  14. No aortic valve stenosis.  15. There is no evidence of pericardial effusion.       ANAMIKA PAULAKEVIN  [id number]      HPI:  68-year-old female with PMHx CREST syndrome, HLD, HTN admitted for visual disturbance found to have a CVA. presenting with blurred vision. On friday, after dinner, started having double vision (vertically). This improved and on Saturday, was having blurred vision of the outer half of her right eye. This gradually improved over the weekend. Was seen today by her optho and was sent to ED to r/o cva/tia. Upon admission, she is still with slight blurring of her outer right visual field. Denies any cp, palpitations, dizziness, syncope or other complaints.     Family at bedside, all questions answered.  (2023 14:21)      ALLERGIES:  No Known Allergies      PAST MEDICAL & SURGICAL HISTORY:  HTN (hypertension)      HLD (hyperlipidemia)      Calcinosis, Raynaud's phenomenon, sclerodactyly, and telangiectasia (CREST) syndrome      HNP (herniated nucleus pulposus), lumbar      History of pleural effusion  right  ; resolved      Leg swelling      Heart murmur      H/O  section        S/P cervical discectomy              CURRENT MEDICATIONS:  MEDICATIONS  (STANDING):  apixaban 5 milliGRAM(s) Oral every 12 hours  aspirin enteric coated 162 milliGRAM(s) Oral daily  atorvastatin 80 milliGRAM(s) Oral at bedtime  metoprolol succinate ER 50 milliGRAM(s) Oral daily  multivitamin/minerals/iron Oral Solution (CENTRUM) 15 milliLiter(s) Oral daily  pantoprazole    Tablet 40 milliGRAM(s) Oral before breakfast    MEDICATIONS  (PRN):  acetaminophen     Tablet .. 650 milliGRAM(s) Oral every 6 hours PRN Mild Pain (1 - 3), Moderate Pain (4 - 6), Severe Pain (7 - 10)  aluminum hydroxide/magnesium hydroxide/simethicone Suspension 30 milliLiter(s) Oral every 4 hours PRN Dyspepsia  ondansetron Injectable 4 milliGRAM(s) IV Push every 8 hours PRN Nausea and/or Vomiting      SOCIAL HISTORY: former smoker 26 years ago      FAMILY HISTORY:  Family history of diabetes mellitus in sister    FHx: heart disease  parents    Family history of abdominal aortic aneurysm (AAA)  mother      ROS:  All 10 systems reviewed and positives noted in HPI    OBJECTIVE:    VITAL SIGNS:  Vital Signs Last 24 Hrs  T(C): 36.2 (2023 11:18), Max: 36.6 (2023 20:55)  T(F): 97.2 (2023 11:18), Max: 97.8 (2023 20:55)  HR: 101 (2023 11:18) (62 - 101)  BP: 125/94 (2023 11:18) (125/94 - 164/66)  BP(mean): --  RR: 16 (2023 11:18) (16 - 18)  SpO2: 96% (2023 11:18) (96% - 97%)    Parameters below as of 2023 11:18  Patient On (Oxygen Delivery Method): room air      PHYSICAL EXAM:  General: well appearing, no distress  HEENT: sclera anicteric  Neck: supple, no carotid bruits b/l  CVS: JVP ~ 7 cm H20, RRR, s1, s2, no murmurs/rubs/gallops  Chest: unlabored respirations, clear to auscultation b/l  Abdomen: non-distended  Extremities: no lower extremity edema b/l  Neuro: awake, alert & oriented x 3  Psych: normal affect      LABS:                        14.0   6.88  )-----------( 202      ( 2023 06:21 )             41.8     11-14    133<L>  |  98  |  24<H>  ----------------------------<  84  3.8   |  25  |  1.08    Ca    9.8      2023 06:21    TPro  8.4<H>  /  Alb  3.9  /  TBili  0.5  /  DBili  x   /  AST  35  /  ALT  25  /  AlkPhos  84  11-13        PT/INR - ( 2023 14:20 )   PT: 11.9 sec;   INR: 1.02 ratio         PTT - ( 2023 14:20 )  PTT:36.9 sec      ECG: NSR    TTE: < from: TTE Echo Complete w/o Contrast w/ Doppler (23 @ 08:20) >   1. The heart rhythm is irregular throughout the study.   2. Normal global left ventricular systolic function.   3. Left ventricular ejection fraction, by visual estimation, is 65 to   70%.   4. Increased LV wall thickness.   5. Normal left ventricular internal cavity size.   6. Left ventricle chordal calcification.   7. The mitral in-flow pattern reveals no discernable A-wave, therefore   no comment on diastolic function can be made.   8. Normal right ventricular size and function.   9. The left atrium is normal in size.  10. The right atrium is normal in size.  11. Mild thickening of the anterior mitral valve leaflet.  12. Trace mitral valve regurgitation.  13. Sclerotic aortic valve with normal opening.  14. No aortic valve stenosis.  15. There is no evidence of pericardial effusion.       ANAMIKA PAULAKEVIN  [id number]      HPI:  68-year-old female with PMHx CREST syndrome, HLD, HTN admitted for visual disturbance found to have a CVA. Presenting with blurred vision. On Friday after dinner, started having double vision (vertically). This improved and on Saturday, was having blurred vision of the outer half of her right eye. This gradually improved over the weekend. Was seen today by her Ophthamologist and was sent to ED to r/o CVA/TIA. Upon admission, she is still with slight blurring of her outer right visual field. Denies any chest pain, palpitations, dizziness, syncope or other complaints.     Family at bedside, all questions answered.  (2023 14:21)      ALLERGIES:  No Known Allergies      PAST MEDICAL & SURGICAL HISTORY:  HTN (hypertension)  HLD (hyperlipidemia)  Calcinosis, Raynaud's phenomenon, sclerodactyly, and telangiectasia (CREST) syndrome  HNP (herniated nucleus pulposus), lumbar  History of pleural effusion  Heart murmur  H/O  section  S/P cervical discectomy      CURRENT MEDICATIONS:  MEDICATIONS  (STANDING):  apixaban 5 milliGRAM(s) Oral every 12 hours  aspirin enteric coated 162 milliGRAM(s) Oral daily  atorvastatin 80 milliGRAM(s) Oral at bedtime  metoprolol succinate ER 50 milliGRAM(s) Oral daily  multivitamin/minerals/iron Oral Solution (CENTRUM) 15 milliLiter(s) Oral daily  pantoprazole    Tablet 40 milliGRAM(s) Oral before breakfast    MEDICATIONS  (PRN):  acetaminophen     Tablet .. 650 milliGRAM(s) Oral every 6 hours PRN Mild Pain (1 - 3), Moderate Pain (4 - 6), Severe Pain (7 - 10)  aluminum hydroxide/magnesium hydroxide/simethicone Suspension 30 milliLiter(s) Oral every 4 hours PRN Dyspepsia  ondansetron Injectable 4 milliGRAM(s) IV Push every 8 hours PRN Nausea and/or Vomiting      SOCIAL HISTORY: former smoker 26 years ago      FAMILY HISTORY:  Family history of diabetes mellitus in sister  FHx: heart disease parents  Family history of abdominal aortic aneurysm (AAA) mother    ROS:  All 10 systems reviewed and positives noted in HPI    OBJECTIVE:    VITAL SIGNS:  Vital Signs Last 24 Hrs  T(C): 36.2 (2023 11:18), Max: 36.6 (2023 20:55)  T(F): 97.2 (2023 11:18), Max: 97.8 (2023 20:55)  HR: 101 (2023 11:18) (62 - 101)  BP: 125/94 (2023 11:18) (125/94 - 164/66)  BP(mean): --  RR: 16 (2023 11:18) (16 - 18)  SpO2: 96% (2023 11:18) (96% - 97%)    Parameters below as of 2023 11:18  Patient On (Oxygen Delivery Method): room air      PHYSICAL EXAM:  General: well appearing, no distress  HEENT: sclera anicteric  Neck: supple, no carotid bruits b/l  CVS: JVP ~ 7 cm H20, RRR, s1, s2, no murmurs/rubs  Chest: unlabored respirations, clear to auscultation b/l  Abdomen:obese  Extremities: no lower extremity edema b/l  Neuro: awake, alert & oriented  Psych: normal affect      LABS:                        14.0   6.88  )-----------( 202      ( 2023 06:21 )             41.8     11-14    133<L>  |  98  |  24<H>  ----------------------------<  84  3.8   |  25  |  1.08    Ca    9.8      2023 06:21    TPro  8.4<H>  /  Alb  3.9  /  TBili  0.5  /  DBili  x   /  AST  35  /  ALT  25  /  AlkPhos  84  11-13        PT/INR - ( 2023 14:20 )   PT: 11.9 sec;   INR: 1.02 ratio         PTT - ( 2023 14:20 )  PTT:36.9 sec      ECG: NSR    TTE: < from: TTE Echo Complete w/o Contrast w/ Doppler (23 @ 08:20) >   1. The heart rhythm is irregular throughout the study.   2. Normal global left ventricular systolic function.   3. Left ventricular ejection fraction, by visual estimation, is 65 to   70%.   4. Increased LV wall thickness.   5. Normal left ventricular internal cavity size.   6. Left ventricle chordal calcification.   7. The mitral in-flow pattern reveals no discernable A-wave, therefore   no comment on diastolic function can be made.   8. Normal right ventricular size and function.   9. The left atrium is normal in size.  10. The right atrium is normal in size.  11. Mild thickening of the anterior mitral valve leaflet.  12. Trace mitral valve regurgitation.  13. Sclerotic aortic valve with normal opening.  14. No aortic valve stenosis.  15. There is no evidence of pericardial effusion.

## 2023-11-14 NOTE — SWALLOW BEDSIDE ASSESSMENT ADULT - SLP GENERAL OBSERVATIONS
Patient ambulated from bathroom to bed independently. A&Ox4, no speech/language/cognitive deficits noted. Reports only change is in vision

## 2023-11-14 NOTE — PROGRESS NOTE ADULT - PROBLEM SELECTOR PLAN 1
- Acute w/ residual R peripheral visual field deficit  - CT showing acute/subacute left-sided PCA distribution infarction affecting the left posteromedial temporal and left paramedian occipital lobes with associated cytotoxic edema  - CTA showing normal CTA of the neck. Occlusion of the distal calcarine portion of the left posterior cerebral artery.  - A1c 5.8, lipid panel WNL  - F/U Echo and MRI today  - C/w atorvastatin 80mg QD  - Appreciate Neuro recs - repeat CT head w/o contrast in AM, D/C ASA, start eliquis 5mg BID  - Appreciate PT and Speech recs

## 2023-11-14 NOTE — PROGRESS NOTE ADULT - SUBJECTIVE AND OBJECTIVE BOX
Patient is a 68 year old woman admitted today, 11/13/23.  She states  friday, 11/10/23 she noted  double vision with images vertical. This resolved and the next day, she noted blurring of the far right visual field. She notes an intermittent mild frontal headache which resolves with tylenol. She denies other associated neurological complaints. She denies fever chest pain, trauma. She states she has continued to improve with minimal blurring of the far right visual field. She saw an ophthalmalogist, Dr. Guzman, and referred to hospital for stroke evaluation. Today, Tuesday she states she continues with slight blurring of the far right visual field.     PMH: HLD          HTN          Lumbar disc disease         S/P cervical discectomy 2000    SH: No allergy    Exam: Awake, alert, appropriate           Pupils 2.5mm, EOM intact, no nystagmus, VFF           CN II-XII intact           Motor tone and strength  RUE  5/5      LUE   5/5                                                  RLE  5/5      LLE   5/5                          14.8   9.23  )-----------( 203      ( 13 Nov 2023 13:45 )             43.7   11-13    133<L>  |  99  |  27<H>  ----------------------------<  114<H>  4.6   |  25  |  1.06    Ca    9.9      13 Nov 2023 13:45    TPro  8.4<H>  /  Alb  3.9  /  TBili  0.5  /  DBili  x   /  AST  35  /  ALT  25  /  AlkPhos  84  11-13      Lipid Profile in AM (11.14.23 @ 06:21)    Cholesterol: 129: Interpretive Comment:  Acceptable: <200 mg/dL (for adults) ; <170 mg/dL (for children)  Borderline: 200-239 mg/dL (for adults) ; 170-199 mg/dL (for children)  High: >=240 mg/dL (for adults) ; >=200 mg/dL (for children) mg/dL   Triglycerides, Serum: 89 mg/dL   HDL Cholesterol: 56: Interpretive Comment:  HDL cholesterol less than 40 mg/dL is a risk factor for cardiovascular  disease mg/dL   Non HDL Cholesterol: 73: Interpretive Comment:  Non-HDL cholesterol is a secondary target of therapy in persons with high  serum triglycerides (> 199 mg/dL). The goal for non-HDL cholesterol in  persons with high triglycerides is 30 mg/dL higher than their LDL  cholesterol goal.  Acceptable: < 130 mg/dL (for adults) ; <120 mg/dL (for children)  Borderline: 130-159 mg/dL (for adults) ; 120-144 mg/dL (for children)  High: >=160 mg/dL (for adults) ; >=145 mg/dL (for children)  The suggested cutoff points are based on recommendations from the  American College of Cardiology/American Heart Association (ACC/AHA)  guidelines on the management of blood cholesterol Circulation.  2019;139:a7050-v8445, the National Cholesterol Education Program JAQUELINE.  2001;285(19):1387-2342, and the expert panel on integrated guidelines  for cardiovascular health and risk reduction in children and adolescents  Pediatrics. 2011;128 Suppl 5(Suppl 5):T488-D694. mg/dL   LDL Cholesterol Calculated: 56: Interpretive Comment:  The calculation for LDL cholesterol is based on the Salvador/NIH equation  (JAQUELINE Cardiol. 2020;5(5):540–548. doi:10.1001/jamacardio.2020.0013). The  acceptable LDL cholesterol concentration may vary based on cardiovascular  disease risk and treatment goals.  Acceptable: <130 mg/dL (for adults) ; <110 mg/dL (for children)  Borderline: 130-159 mg/dL (for adults) ; 110-129 mg/dL (for children)  High: >=160 mg/dL (for adults) ; >=130 mg/dL (for children) mg/dL          < from: CT Head No Cont (11.13.23 @ 13:44) >    FINDINGS: Hypoattenuation is seen involving the left paramedian temporal   and occipital lobes and the PCA distribution with sulcal effacement. No   hemorrhagic transformation is seen.    There is no acute intracranial hemorrhage, shift of the midline   structures, herniation, or hydrocephalus.    The paranasal sinuses and tympanomastoid cavities are clear. The   calvarium appears intact. The orbits appear unremarkable.    IMPRESSION: Acute/subacute left-sided PCA distribution infarction   affecting the left posteromedial temporal and left paramedian occipital   lobes with associated cytotoxic edema. No hemorrhagic transformation.    Dr. Gen Kowalski discussed findings with LUIS F Hairston on 11/13/2023 at 1:45 PM    --- End of Report ---            GEN KOWALSKI MD; Attending Radiologist  This document has been electronically signed. Nov 13 2023  1:47PM      < from: TTE Echo Complete w/o Contrast w/ Doppler (11.14.23 @ 08:20) >    Summary:   1. The heart rhythm is irregular throughout the study.   2. Normal global left ventricular systolic function.   3. Left ventricular ejection fraction, by visual estimation, is 65 to   70%.   4. Increased LV wall thickness.   5. Normal left ventricular internal cavity size.   6. Left ventricle chordal calcification.   7. The mitral in-flow pattern reveals no discernable A-wave, therefore   no comment on diastolic function can be made.   8. Normal right ventricular size and function.   9. The left atrium is normal in size.  10. The right atrium is normal in size.  11. Mild thickening of the anterior mitral valve leaflet.  12. Trace mitral valve regurgitation.  13. Sclerotic aortic valve with normal opening.  14. No aortic valve stenosis.  15. There is no evidence of pericardial effusion.    [id number] [facility] Smith MD Electronically signed on   11/14/2023 at 2:44:14 PM        < from: CT Angio Head w/ IV Cont (11.13.23 @ 16:44) >    The origins of the carotid and vertebral arteries are normal. The right   vertebral artery is dominant. The carotid bifurcations demonstrate   calcification without significant stenosis.      The distal vertebral arteries are well identified as are the   posterior-inferior cerebellar arteries bilaterally. The region of the   vertebral basilar junction is normal. The basilar artery is normal. The   proximal posterior cerebral and superior cerebellar arteries are normal.  There is a cutoff of the distal calcarine portion of the left posterior   cerebral artery consistent with a recent infarction    Evaluation of the carotid arteries demonstrate normal appearance to the   the distal cervical, petrous, cavernous and supraclinoid internal carotid   arteries. The anterior cerebral arteries anterior communicating artery   and middle cerebral arteries are normal.    The normal intracranial venous circulation is identified. The right   transverse sinus is dominant. The superior sagittal sinus, internal   cerebral veins, vein of Mihir, straight sinus, transverse sinuses,   sigmoid sinuses and internal jugular veins are normal. Cortical veins are   normal.    There is an anterior cervical discectomy with interposition graft and   anterior metallic plating at the C5-6 level.        IMPRESSION: Normal CTA of the neck. Occlusion of the distal calcarine   portion of the left posterior cerebral artery.        ARLEEN ALEMAN MD; Attending Radiologist    < from: MR Head No Cont (11.14.23 @ 13:41) >    This exam is compared with prior head CT performed on November 13, 2023.    The lateral ventricles have a normal configured    There is abnormal T2 prolongation with restricted diffusion seen   involving posterior left temporal and medialoccipital cortex. These   findings are compatible with acute left PCA infarcts. No hemorrhagic   transformation is seen. No significant shift or herniation is seen.    The large vessels demonstrate normal flow voids    The visualized paranasal sinuses mastoid and middle ear regions appear   clear.    IMPRESSION: Acute left PCA infarct.    --- End of Report ---            NIKOLE BOGGS MD; Attending Radiologist  This document has been electronically signed. Nov 14 2023  1:53PM    < end of copied text >        This document has been electronically signed. Nov 13 2023  4:52PM    < end of copied text >

## 2023-11-14 NOTE — PROGRESS NOTE ADULT - SUBJECTIVE AND OBJECTIVE BOX
Patient is a 68-year-old female with PMHx CREST syndrome, HLD, HTN admitted for visual disturbance found to have a CVA.    Overnight Events:  Interval HPI: Patient seen and examined at bedside.    REVIEW OF SYSTEMS:  CONSTITUTIONAL: (-) weakness, (-) fevers, (-) chills  EYES/ENT: (-) visual changes,  (-) vertigo,  (-) throat pain   NECK:  (-) pain, (-) stiffness  RESPIRATORY:  (-) shortness of breath, (-) cough,  (-) wheezing,  (-) hemoptysis   CARDIOVASCULAR:  (-) chest pain, (-) palpitations  GASTROINTESTINAL:  (-) abdominal or epigastric pain, (-) nausea, (-) vomiting, (-) diarrhea, (-) constipation, (-) melena,  (-) hematemesis,  (-) hematochezia  GENITOURINARY: (-) dysuria, (-) frequency, (-) hematuria  NEUROLOGICAL: (-) numbness, (-) weakness  SKIN: (-) itching, (-) rashes, (-) lesions    Vital Signs Last 24 Hrs  T(C): 36.4 (14 Nov 2023 04:45), Max: 36.8 (13 Nov 2023 13:30)  T(F): 97.5 (14 Nov 2023 04:45), Max: 98.2 (13 Nov 2023 13:30)  HR: 62 (14 Nov 2023 04:45) (60 - 85)  BP: 164/66 (14 Nov 2023 04:45) (163/78 - 179/88)  BP(mean): --  RR: 16 (14 Nov 2023 04:45) (16 - 18)  SpO2: 96% (14 Nov 2023 04:45) (96% - 99%)    Parameters below as of 14 Nov 2023 04:45  Patient On (Oxygen Delivery Method): room air      PHYSICAL EXAM:  GENERAL: NAD, lying in bed comfortably  HEAD:  Atraumatic, Normocephalic  EYES: EOMI, conjunctiva and sclera clear  ENT: Moist mucous membranes  NECK: Supple, No JVD  CHEST/LUNG: Clear to auscultation bilaterally, good air entry bilaterally; No wheezing, rales, or rhonchi. Unlabored respirations  HEART: Regular rate and rhythm. S1 and S2. No murmurs, rubs, or gallops  ABDOMEN: Soft, Nontender, Nondistended. Bowel sounds present.   EXTREMITIES:  2+ Peripheral Pulses. Capillary refill <2 seconds. No clubbing, cyanosis, or edema  NERVOUS SYSTEM:  Alert & Oriented X3, speech clear. No deficits.  MSK: FROM all 4 extremities, full and equal strength  SKIN: No rashes, bruises, or other lesions    MEDICATIONS:  MEDICATIONS  (STANDING):  aspirin enteric coated 162 milliGRAM(s) Oral daily  atorvastatin 80 milliGRAM(s) Oral at bedtime  enoxaparin Injectable 40 milliGRAM(s) SubCutaneous every 24 hours  metoprolol succinate ER 50 milliGRAM(s) Oral daily  multivitamin/minerals/iron Oral Solution (CENTRUM) 15 milliLiter(s) Oral daily  pantoprazole    Tablet 40 milliGRAM(s) Oral before breakfast      LABS:   All Labs Personally Reviewed                         14.0   6.88  )-----------( 202      ( 14 Nov 2023 06:21 )             41.8     11-13    133<L>  |  99  |  27<H>  ----------------------------<  114<H>  4.6   |  25  |  1.06    Ca    9.9      13 Nov 2023 13:45    TPro  8.4<H>  /  Alb  3.9  /  TBili  0.5  /  DBili  x   /  AST  35  /  ALT  25  /  AlkPhos  84  11-13    PT/INR - ( 13 Nov 2023 14:20 )   PT: 11.9 sec;   INR: 1.02 ratio         PTT - ( 13 Nov 2023 14:20 )  PTT:36.9 sec      Blood Culture:   I&O's Summary    CAPILLARY BLOOD GLUCOSE          RADIOLOGY/EKG:  All Imaging and EKGs Personally Reviewed        Patient is a 68-year-old female with PMHx CREST syndrome, HLD, HTN admitted for visual disturbance found to have a CVA.    Overnight Events: None  Interval HPI: Patient seen and examined at bedside. Patient feeling well, still with right outer visual field deficit. Denies any pain, voiding appropriately. She wants to go to a wedding on Friday so she in excited to leave the hospital. Discussed plan for echo, physical therapy and speech evaluation, as well as Cardiology consult as she was noted to have afib, which she's never been told she has before. Patient without further questions or concerns at this time.    REVIEW OF SYSTEMS:  CONSTITUTIONAL: (-) weakness, (-) fevers, (-) chills  EYES/ENT: (-) visual changes,  (-) vertigo,  (-) throat pain   NECK:  (-) pain, (-) stiffness  RESPIRATORY:  (-) shortness of breath, (-) cough,  (-) wheezing,  (-) hemoptysis   CARDIOVASCULAR:  (-) chest pain, (-) palpitations  GASTROINTESTINAL:  (-) abdominal or epigastric pain, (-) nausea, (-) vomiting, (-) diarrhea, (-) constipation, (-) melena,  (-) hematemesis,  (-) hematochezia  GENITOURINARY: (-) dysuria, (-) frequency, (-) hematuria  NEUROLOGICAL: (-) numbness, (-) weakness  SKIN: (-) itching, (-) rashes, (-) lesions    Vital Signs Last 24 Hrs  T(C): 36.4 (14 Nov 2023 04:45), Max: 36.8 (13 Nov 2023 13:30)  T(F): 97.5 (14 Nov 2023 04:45), Max: 98.2 (13 Nov 2023 13:30)  HR: 62 (14 Nov 2023 04:45) (60 - 85)  BP: 164/66 (14 Nov 2023 04:45) (163/78 - 179/88)  BP(mean): --  RR: 16 (14 Nov 2023 04:45) (16 - 18)  SpO2: 96% (14 Nov 2023 04:45) (96% - 99%)    Parameters below as of 14 Nov 2023 04:45  Patient On (Oxygen Delivery Method): room air      PHYSICAL EXAM:  GENERAL: NAD, lying in bed comfortably  HEAD:  Atraumatic, Normocephalic  EYES: EOMI, conjunctiva and sclera clear  ENT: Moist mucous membranes  NECK: Supple  CHEST/LUNG: Clear to auscultation bilaterally, good air entry bilaterally; No wheezing, rales, or rhonchi. Unlabored respirations  HEART: Regular rate and rhythm. S1 and S2. No murmurs, rubs, or gallops  ABDOMEN: Soft, Nontender, Nondistended. Bowel sounds present.   EXTREMITIES:  2+ Peripheral Pulses. No clubbing, cyanosis, or edema  NERVOUS SYSTEM:  Alert & Oriented X3, speech clear. No deficits.  MSK: FROM all 4 extremities, full and equal strength  SKIN: No rashes, bruises, or other lesions    MEDICATIONS:  MEDICATIONS  (STANDING):  aspirin enteric coated 162 milliGRAM(s) Oral daily  atorvastatin 80 milliGRAM(s) Oral at bedtime  enoxaparin Injectable 40 milliGRAM(s) SubCutaneous every 24 hours  metoprolol succinate ER 50 milliGRAM(s) Oral daily  multivitamin/minerals/iron Oral Solution (CENTRUM) 15 milliLiter(s) Oral daily  pantoprazole    Tablet 40 milliGRAM(s) Oral before breakfast      LABS:   All Labs Personally Reviewed                         14.0   6.88  )-----------( 202      ( 14 Nov 2023 06:21 )             41.8     11-13    133<L>  |  99  |  27<H>  ----------------------------<  114<H>  4.6   |  25  |  1.06    Ca    9.9      13 Nov 2023 13:45    TPro  8.4<H>  /  Alb  3.9  /  TBili  0.5  /  DBili  x   /  AST  35  /  ALT  25  /  AlkPhos  84  11-13    PT/INR - ( 13 Nov 2023 14:20 )   PT: 11.9 sec;   INR: 1.02 ratio         PTT - ( 13 Nov 2023 14:20 )  PTT:36.9 sec      Blood Culture:   I&O's Summary    CAPILLARY BLOOD GLUCOSE          RADIOLOGY/EKG:  All Imaging and EKGs Personally Reviewed     CT Head No Cont (11.13.23 @ 13:44)  IMPRESSION: Acute/subacute left-sided PCA distribution infarction   affecting the left posteromedial temporal and left paramedian occipital   lobes with associated cytotoxic edema. No hemorrhagic transformation.    Xray Chest 1 View- PORTABLE-Urgent (11.13.23 @ 14:39)   Impression:  No acute pulmonary disease.    CT Angio Head w/ IV Cont (11.13.23 @ 16:44)   IMPRESSION: Normal CTA of the neck. Occlusion of the distal calcarine   portion of the left posterior cerebral artery.    MR Head No Cont (11.14.23 @ 13:41)   IMPRESSION: Acute left PCA infarct.         Patient is a 68-year-old female with PMHx CREST syndrome, HLD, HTN admitted for visual disturbance found to have a CVA.    Overnight Events: None  Interval HPI: Patient seen and examined at bedside. Patient feeling well, still with right outer visual field deficit. Denies any pain, voiding appropriately. She wants to go to a wedding on Friday so she in excited to leave the hospital. Discussed plan for echo, physical therapy and speech evaluation, as well as Cardiology consult as she was noted to have afib, which she states she's had palpitations before that she was told were afib. Patient without further questions or concerns at this time.    REVIEW OF SYSTEMS:  CONSTITUTIONAL: (-) weakness, (-) fevers, (-) chills  EYES/ENT: (-) visual changes,  (-) vertigo,  (-) throat pain   NECK:  (-) pain, (-) stiffness  RESPIRATORY:  (-) shortness of breath, (-) cough,  (-) wheezing,  (-) hemoptysis   CARDIOVASCULAR:  (-) chest pain, (-) palpitations  GASTROINTESTINAL:  (-) abdominal or epigastric pain, (-) nausea, (-) vomiting, (-) diarrhea, (-) constipation, (-) melena,  (-) hematemesis,  (-) hematochezia  GENITOURINARY: (-) dysuria, (-) frequency, (-) hematuria  NEUROLOGICAL: (-) numbness, (-) weakness  SKIN: (-) itching, (-) rashes, (-) lesions    Vital Signs Last 24 Hrs  T(C): 36.4 (14 Nov 2023 04:45), Max: 36.8 (13 Nov 2023 13:30)  T(F): 97.5 (14 Nov 2023 04:45), Max: 98.2 (13 Nov 2023 13:30)  HR: 62 (14 Nov 2023 04:45) (60 - 85)  BP: 164/66 (14 Nov 2023 04:45) (163/78 - 179/88)  BP(mean): --  RR: 16 (14 Nov 2023 04:45) (16 - 18)  SpO2: 96% (14 Nov 2023 04:45) (96% - 99%)    Parameters below as of 14 Nov 2023 04:45  Patient On (Oxygen Delivery Method): room air      PHYSICAL EXAM:  GENERAL: NAD, lying in bed comfortably  HEAD:  Atraumatic, Normocephalic  EYES: EOMI, conjunctiva and sclera clear  ENT: Moist mucous membranes  NECK: Supple  CHEST/LUNG: Clear to auscultation bilaterally, good air entry bilaterally; No wheezing, rales, or rhonchi. Unlabored respirations  HEART: Regular rate and rhythm. S1 and S2. No murmurs, rubs, or gallops  ABDOMEN: Soft, Nontender, Nondistended. Bowel sounds present.   EXTREMITIES:  2+ Peripheral Pulses. No clubbing, cyanosis, or edema  NERVOUS SYSTEM:  Alert & Oriented X3, speech clear. No deficits.  MSK: FROM all 4 extremities, full and equal strength  SKIN: No rashes, bruises, or other lesions    MEDICATIONS:  MEDICATIONS  (STANDING):  aspirin enteric coated 162 milliGRAM(s) Oral daily  atorvastatin 80 milliGRAM(s) Oral at bedtime  enoxaparin Injectable 40 milliGRAM(s) SubCutaneous every 24 hours  metoprolol succinate ER 50 milliGRAM(s) Oral daily  multivitamin/minerals/iron Oral Solution (CENTRUM) 15 milliLiter(s) Oral daily  pantoprazole    Tablet 40 milliGRAM(s) Oral before breakfast      LABS:   All Labs Personally Reviewed                         14.0   6.88  )-----------( 202      ( 14 Nov 2023 06:21 )             41.8     11-13    133<L>  |  99  |  27<H>  ----------------------------<  114<H>  4.6   |  25  |  1.06    Ca    9.9      13 Nov 2023 13:45    TPro  8.4<H>  /  Alb  3.9  /  TBili  0.5  /  DBili  x   /  AST  35  /  ALT  25  /  AlkPhos  84  11-13    PT/INR - ( 13 Nov 2023 14:20 )   PT: 11.9 sec;   INR: 1.02 ratio         PTT - ( 13 Nov 2023 14:20 )  PTT:36.9 sec      Blood Culture:   I&O's Summary    CAPILLARY BLOOD GLUCOSE          RADIOLOGY/EKG:  All Imaging and EKGs Personally Reviewed     CT Head No Cont (11.13.23 @ 13:44)  IMPRESSION: Acute/subacute left-sided PCA distribution infarction   affecting the left posteromedial temporal and left paramedian occipital   lobes with associated cytotoxic edema. No hemorrhagic transformation.    Xray Chest 1 View- PORTABLE-Urgent (11.13.23 @ 14:39)   Impression:  No acute pulmonary disease.    CT Angio Head w/ IV Cont (11.13.23 @ 16:44)   IMPRESSION: Normal CTA of the neck. Occlusion of the distal calcarine   portion of the left posterior cerebral artery.    MR Head No Cont (11.14.23 @ 13:41)   IMPRESSION: Acute left PCA infarct.

## 2023-11-14 NOTE — CONSULT NOTE ADULT - NS ATTEND AMEND GEN_ALL_CORE FT
I have seen and examined the patient. I have discussed case with HEAVEN Millan.    Ngozi Charlton is a 68 year old woman with past medical history of Atrial fibrillation (on Aspirin), Hypertension, Hyperlipidemia, Obesity and CREST syndrome who presented with double vision, found to have acute left PCA stroke.    ECG on presentation consistent with sinus rhythm and delayed R wave progression. Telemetry consistent with atrial fibrillation 110s BPM. Echo consistent with normal LVEF 65-70%, normal RV size and function, no significant valvular disease. The patient reports that she follows with a cardiologist and has had occasional palpitations and episodes of atrial fibrillation for years and has been prescribed Aspirin 162 mg daily. Overall, patient appears to have had acute left PCA stroke, possible etiology thromboembolic from atrial fibrillation, but will defer etiology of CVA to Neurology. Due to CVA and elevated [id number] score agree with anticoagulation for stroke prophylaxis, such as Eliquis but confirm with Neurology first when safe to start given risk for hemorrhagic conversion with recent stroke. There is no cardiac indication for Aspirin, would defer this to Neurology. Agree with high intensity statin. Recommend outpatient follow up with her cardiologist in 3-4 weeks. Patient and family at bedside agree with plan. All questions answered.    Scottie Smith MD  Cardiology

## 2023-11-14 NOTE — PROGRESS NOTE ADULT - ASSESSMENT
Patient is a 68-year-old female with PMHx CREST syndrome, HLD, HTN admitted for visual disturbance found to have a CVA.    Discussed with attending, Dr. Medrano Patient is a 68-year-old female with PMHx CREST syndrome, HLD, HTN admitted for visual disturbance found to have a CVA, now with new afib, pending echo, and Cardiology consult.    Discussed with attending, Dr. Medrano

## 2023-11-14 NOTE — PHYSICAL THERAPY INITIAL EVALUATION ADULT - PERTINENT HX OF CURRENT PROBLEM, REHAB EVAL
67yo female with PMHx as below presenting with blurred vision. On friday, after dinner, started having double vision (vertically). This improved and on Saturday, was having blurred vision of the outer half of her right eye. This gradually improved over the weekend. Was seen today by her optho and was sent to ED to r/o cva/tia. Upon admission, she is still with slight blurring of her outer right visual field. Denies any cp, palpitations, dizziness, syncope or other complaints.

## 2023-11-14 NOTE — SPEECH LANGUAGE PATHOLOGY EVALUATION - SLP DIAGNOSIS
Patient presents with expressive/receptive language WFL. No motor speech deficits noted. Regarding cognition, mildly reduced short-term recall of novel information however without a functional impact. Patient able to recall hospital course, doctors/nurses, tasks completed and conversations had today. Attention, immediate memory, working memory, problem solving/reasoning and insight judged to be WFL. Patient may benefit from further high level cognitive testing (outpatient) should patient experience difficulty/changes completing iADLs once home. Patient and son at bedside provided education. Further SLP intervention not warranted at this time

## 2023-11-15 ENCOUNTER — TRANSCRIPTION ENCOUNTER (OUTPATIENT)
Age: 68
End: 2023-11-15

## 2023-11-15 VITALS
OXYGEN SATURATION: 97 % | HEART RATE: 68 BPM | DIASTOLIC BLOOD PRESSURE: 79 MMHG | RESPIRATION RATE: 18 BRPM | SYSTOLIC BLOOD PRESSURE: 143 MMHG | TEMPERATURE: 98 F

## 2023-11-15 LAB
ANION GAP SERPL CALC-SCNC: 9 MMOL/L — SIGNIFICANT CHANGE UP (ref 5–17)
ANION GAP SERPL CALC-SCNC: 9 MMOL/L — SIGNIFICANT CHANGE UP (ref 5–17)
BUN SERPL-MCNC: 29 MG/DL — HIGH (ref 7–23)
BUN SERPL-MCNC: 29 MG/DL — HIGH (ref 7–23)
CALCIUM SERPL-MCNC: 9.1 MG/DL — SIGNIFICANT CHANGE UP (ref 8.4–10.5)
CALCIUM SERPL-MCNC: 9.1 MG/DL — SIGNIFICANT CHANGE UP (ref 8.4–10.5)
CHLORIDE SERPL-SCNC: 93 MMOL/L — LOW (ref 96–108)
CHLORIDE SERPL-SCNC: 93 MMOL/L — LOW (ref 96–108)
CO2 SERPL-SCNC: 27 MMOL/L — SIGNIFICANT CHANGE UP (ref 22–31)
CO2 SERPL-SCNC: 27 MMOL/L — SIGNIFICANT CHANGE UP (ref 22–31)
CREAT SERPL-MCNC: 1.2 MG/DL — SIGNIFICANT CHANGE UP (ref 0.5–1.3)
CREAT SERPL-MCNC: 1.2 MG/DL — SIGNIFICANT CHANGE UP (ref 0.5–1.3)
EGFR: 49 ML/MIN/1.73M2 — LOW
EGFR: 49 ML/MIN/1.73M2 — LOW
GLUCOSE SERPL-MCNC: 92 MG/DL — SIGNIFICANT CHANGE UP (ref 70–99)
GLUCOSE SERPL-MCNC: 92 MG/DL — SIGNIFICANT CHANGE UP (ref 70–99)
HCT VFR BLD CALC: 39.3 % — SIGNIFICANT CHANGE UP (ref 34.5–45)
HCT VFR BLD CALC: 39.3 % — SIGNIFICANT CHANGE UP (ref 34.5–45)
HGB BLD-MCNC: 13.5 G/DL — SIGNIFICANT CHANGE UP (ref 11.5–15.5)
HGB BLD-MCNC: 13.5 G/DL — SIGNIFICANT CHANGE UP (ref 11.5–15.5)
MAGNESIUM SERPL-MCNC: 1.8 MG/DL — SIGNIFICANT CHANGE UP (ref 1.6–2.6)
MAGNESIUM SERPL-MCNC: 1.8 MG/DL — SIGNIFICANT CHANGE UP (ref 1.6–2.6)
MCHC RBC-ENTMCNC: 33.3 PG — SIGNIFICANT CHANGE UP (ref 27–34)
MCHC RBC-ENTMCNC: 33.3 PG — SIGNIFICANT CHANGE UP (ref 27–34)
MCHC RBC-ENTMCNC: 34.4 GM/DL — SIGNIFICANT CHANGE UP (ref 32–36)
MCHC RBC-ENTMCNC: 34.4 GM/DL — SIGNIFICANT CHANGE UP (ref 32–36)
MCV RBC AUTO: 96.8 FL — SIGNIFICANT CHANGE UP (ref 80–100)
MCV RBC AUTO: 96.8 FL — SIGNIFICANT CHANGE UP (ref 80–100)
NRBC # BLD: 0 /100 WBCS — SIGNIFICANT CHANGE UP (ref 0–0)
NRBC # BLD: 0 /100 WBCS — SIGNIFICANT CHANGE UP (ref 0–0)
PLATELET # BLD AUTO: 179 K/UL — SIGNIFICANT CHANGE UP (ref 150–400)
PLATELET # BLD AUTO: 179 K/UL — SIGNIFICANT CHANGE UP (ref 150–400)
POTASSIUM SERPL-MCNC: 4.4 MMOL/L — SIGNIFICANT CHANGE UP (ref 3.5–5.3)
POTASSIUM SERPL-MCNC: 4.4 MMOL/L — SIGNIFICANT CHANGE UP (ref 3.5–5.3)
POTASSIUM SERPL-SCNC: 4.4 MMOL/L — SIGNIFICANT CHANGE UP (ref 3.5–5.3)
POTASSIUM SERPL-SCNC: 4.4 MMOL/L — SIGNIFICANT CHANGE UP (ref 3.5–5.3)
RBC # BLD: 4.06 M/UL — SIGNIFICANT CHANGE UP (ref 3.8–5.2)
RBC # BLD: 4.06 M/UL — SIGNIFICANT CHANGE UP (ref 3.8–5.2)
RBC # FLD: 12.9 % — SIGNIFICANT CHANGE UP (ref 10.3–14.5)
RBC # FLD: 12.9 % — SIGNIFICANT CHANGE UP (ref 10.3–14.5)
SODIUM SERPL-SCNC: 129 MMOL/L — LOW (ref 135–145)
SODIUM SERPL-SCNC: 129 MMOL/L — LOW (ref 135–145)
WBC # BLD: 6.61 K/UL — SIGNIFICANT CHANGE UP (ref 3.8–10.5)
WBC # BLD: 6.61 K/UL — SIGNIFICANT CHANGE UP (ref 3.8–10.5)
WBC # FLD AUTO: 6.61 K/UL — SIGNIFICANT CHANGE UP (ref 3.8–10.5)
WBC # FLD AUTO: 6.61 K/UL — SIGNIFICANT CHANGE UP (ref 3.8–10.5)

## 2023-11-15 PROCEDURE — 36415 COLL VENOUS BLD VENIPUNCTURE: CPT

## 2023-11-15 PROCEDURE — 99239 HOSP IP/OBS DSCHRG MGMT >30: CPT

## 2023-11-15 PROCEDURE — 80053 COMPREHEN METABOLIC PANEL: CPT

## 2023-11-15 PROCEDURE — 86803 HEPATITIS C AB TEST: CPT

## 2023-11-15 PROCEDURE — 93306 TTE W/DOPPLER COMPLETE: CPT

## 2023-11-15 PROCEDURE — 71045 X-RAY EXAM CHEST 1 VIEW: CPT

## 2023-11-15 PROCEDURE — 81001 URINALYSIS AUTO W/SCOPE: CPT

## 2023-11-15 PROCEDURE — 70551 MRI BRAIN STEM W/O DYE: CPT

## 2023-11-15 PROCEDURE — 92610 EVALUATE SWALLOWING FUNCTION: CPT

## 2023-11-15 PROCEDURE — 85027 COMPLETE CBC AUTOMATED: CPT

## 2023-11-15 PROCEDURE — 97166 OT EVAL MOD COMPLEX 45 MIN: CPT

## 2023-11-15 PROCEDURE — 85025 COMPLETE CBC W/AUTO DIFF WBC: CPT

## 2023-11-15 PROCEDURE — 80048 BASIC METABOLIC PNL TOTAL CA: CPT

## 2023-11-15 PROCEDURE — 83735 ASSAY OF MAGNESIUM: CPT

## 2023-11-15 PROCEDURE — 70450 CT HEAD/BRAIN W/O DYE: CPT | Mod: 26

## 2023-11-15 PROCEDURE — 70450 CT HEAD/BRAIN W/O DYE: CPT | Mod: MA

## 2023-11-15 PROCEDURE — 70496 CT ANGIOGRAPHY HEAD: CPT

## 2023-11-15 PROCEDURE — 80061 LIPID PANEL: CPT

## 2023-11-15 PROCEDURE — 97162 PT EVAL MOD COMPLEX 30 MIN: CPT

## 2023-11-15 PROCEDURE — 70498 CT ANGIOGRAPHY NECK: CPT

## 2023-11-15 PROCEDURE — 83036 HEMOGLOBIN GLYCOSYLATED A1C: CPT

## 2023-11-15 PROCEDURE — 92523 SPEECH SOUND LANG COMPREHEN: CPT

## 2023-11-15 PROCEDURE — 99285 EMERGENCY DEPT VISIT HI MDM: CPT | Mod: 25

## 2023-11-15 PROCEDURE — 93005 ELECTROCARDIOGRAM TRACING: CPT

## 2023-11-15 PROCEDURE — 99232 SBSQ HOSP IP/OBS MODERATE 35: CPT

## 2023-11-15 PROCEDURE — 85730 THROMBOPLASTIN TIME PARTIAL: CPT

## 2023-11-15 PROCEDURE — 85610 PROTHROMBIN TIME: CPT

## 2023-11-15 RX ORDER — HYDROCHLOROTHIAZIDE 25 MG
1 TABLET ORAL
Refills: 0 | DISCHARGE

## 2023-11-15 RX ORDER — OMEPRAZOLE 10 MG/1
1 CAPSULE, DELAYED RELEASE ORAL
Qty: 0 | Refills: 0 | DISCHARGE

## 2023-11-15 RX ORDER — OMEPRAZOLE 10 MG/1
1 CAPSULE, DELAYED RELEASE ORAL
Qty: 30 | Refills: 0
Start: 2023-11-15

## 2023-11-15 RX ORDER — CANDESARTAN CILEXETIL 8 MG/1
1 TABLET ORAL
Refills: 0 | DISCHARGE

## 2023-11-15 RX ORDER — EZETIMIBE 10 MG/1
1 TABLET ORAL
Qty: 30 | Refills: 0
Start: 2023-11-15

## 2023-11-15 RX ORDER — AMLODIPINE BESYLATE 2.5 MG/1
1 TABLET ORAL
Qty: 30 | Refills: 0
Start: 2023-11-15

## 2023-11-15 RX ORDER — AMLODIPINE BESYLATE 2.5 MG/1
5 TABLET ORAL DAILY
Refills: 0 | Status: DISCONTINUED | OUTPATIENT
Start: 2023-11-15 | End: 2023-11-15

## 2023-11-15 RX ORDER — EZETIMIBE 10 MG/1
1 TABLET ORAL
Qty: 0 | Refills: 0 | DISCHARGE

## 2023-11-15 RX ORDER — ATORVASTATIN CALCIUM 80 MG/1
1 TABLET, FILM COATED ORAL
Qty: 0 | Refills: 0 | DISCHARGE

## 2023-11-15 RX ORDER — ASPIRIN/CALCIUM CARB/MAGNESIUM 324 MG
2 TABLET ORAL
Refills: 0 | DISCHARGE

## 2023-11-15 RX ORDER — ATORVASTATIN CALCIUM 80 MG/1
1 TABLET, FILM COATED ORAL
Qty: 30 | Refills: 0
Start: 2023-11-15

## 2023-11-15 RX ORDER — CANDESARTAN CILEXETIL 8 MG/1
1 TABLET ORAL
Qty: 30 | Refills: 0
Start: 2023-11-15

## 2023-11-15 RX ADMIN — Medication 15 MILLILITER(S): at 11:07

## 2023-11-15 RX ADMIN — Medication 50 MILLIGRAM(S): at 05:34

## 2023-11-15 RX ADMIN — AMLODIPINE BESYLATE 5 MILLIGRAM(S): 2.5 TABLET ORAL at 11:07

## 2023-11-15 RX ADMIN — APIXABAN 5 MILLIGRAM(S): 2.5 TABLET, FILM COATED ORAL at 05:35

## 2023-11-15 RX ADMIN — PANTOPRAZOLE SODIUM 40 MILLIGRAM(S): 20 TABLET, DELAYED RELEASE ORAL at 05:34

## 2023-11-15 NOTE — PROGRESS NOTE ADULT - SUBJECTIVE AND OBJECTIVE BOX
ANAMIKA DANISH  [id number]    Interval events: Pt denies palpitations, cp, sob. NSR on tele    HPI:  68-year-old female with PMHx CREST syndrome, HLD, HTN admitted for visual disturbance found to have a CVA. Presenting with blurred vision. On Friday after dinner, started having double vision (vertically). This improved and on Saturday, was having blurred vision of the outer half of her right eye. This gradually improved over the weekend. Was seen today by her Ophthamologist and was sent to ED to r/o CVA/TIA. Upon admission, she is still with slight blurring of her outer right visual field. Denies any chest pain, palpitations, dizziness, syncope or other complaints.     Family at bedside, all questions answered.  (2023 14:21)      ALLERGIES:  No Known Allergies      PAST MEDICAL & SURGICAL HISTORY:  HTN (hypertension)  HLD (hyperlipidemia)  Calcinosis, Raynaud's phenomenon, sclerodactyly, and telangiectasia (CREST) syndrome  HNP (herniated nucleus pulposus), lumbar  History of pleural effusion  Heart murmur  H/O  section  S/P cervical discectomy      CURRENT MEDICATIONS:  MEDICATIONS  (STANDING):  apixaban 5 milliGRAM(s) Oral every 12 hours  aspirin enteric coated 162 milliGRAM(s) Oral daily  atorvastatin 80 milliGRAM(s) Oral at bedtime  metoprolol succinate ER 50 milliGRAM(s) Oral daily  multivitamin/minerals/iron Oral Solution (CENTRUM) 15 milliLiter(s) Oral daily  pantoprazole    Tablet 40 milliGRAM(s) Oral before breakfast    MEDICATIONS  (PRN):  acetaminophen     Tablet .. 650 milliGRAM(s) Oral every 6 hours PRN Mild Pain (1 - 3), Moderate Pain (4 - 6), Severe Pain (7 - 10)  aluminum hydroxide/magnesium hydroxide/simethicone Suspension 30 milliLiter(s) Oral every 4 hours PRN Dyspepsia  ondansetron Injectable 4 milliGRAM(s) IV Push every 8 hours PRN Nausea and/or Vomiting      SOCIAL HISTORY: former smoker 26 years ago      FAMILY HISTORY:  Family history of diabetes mellitus in sister  FHx: heart disease parents  Family history of abdominal aortic aneurysm (AAA) mother    ROS:  All 10 systems reviewed and positives noted in HPI    OBJECTIVE:    VITAL SIGNS:  Vital Signs Last 24 Hrs  T(C): 36.2 (2023 11:18), Max: 36.6 (2023 20:55)  T(F): 97.2 (2023 11:18), Max: 97.8 (2023 20:55)  HR: 101 (2023 11:18) (62 - 101)  BP: 125/94 (2023 11:18) (125/94 - 164/66)  BP(mean): --  RR: 16 (2023 11:18) (16 - 18)  SpO2: 96% (2023 11:18) (96% - 97%)    Parameters below as of 2023 11:18  Patient On (Oxygen Delivery Method): room air      PHYSICAL EXAM:  General: well appearing, no distress  HEENT: sclera anicteric  Neck: supple, no carotid bruits b/l  CVS: JVP ~ 7 cm H20, RRR, s1, s2, no murmurs/rubs  Chest: unlabored respirations, clear to auscultation b/l  Abdomen:obese  Extremities: no lower extremity edema b/l  Neuro: awake, alert & oriented  Psych: normal affect      LABS:                        14.0   6.88  )-----------( 202      ( 2023 06:21 )             41.8     11-14    133<L>  |  98  |  24<H>  ----------------------------<  84  3.8   |  25  |  1.08    Ca    9.8      2023 06:21    TPro  8.4<H>  /  Alb  3.9  /  TBili  0.5  /  DBili  x   /  AST  35  /  ALT  25  /  AlkPhos  84  11-13        PT/INR - ( 2023 14:20 )   PT: 11.9 sec;   INR: 1.02 ratio         PTT - ( 2023 14:20 )  PTT:36.9 sec      ECG: NSR    TTE: < from: TTE Echo Complete w/o Contrast w/ Doppler (23 @ 08:20) >   1. The heart rhythm is irregular throughout the study.   2. Normal global left ventricular systolic function.   3. Left ventricular ejection fraction, by visual estimation, is 65 to   70%.   4. Increased LV wall thickness.   5. Normal left ventricular internal cavity size.   6. Left ventricle chordal calcification.   7. The mitral in-flow pattern reveals no discernable A-wave, therefore   no comment on diastolic function can be made.   8. Normal right ventricular size and function.   9. The left atrium is normal in size.  10. The right atrium is normal in size.  11. Mild thickening of the anterior mitral valve leaflet.  12. Trace mitral valve regurgitation.  13. Sclerotic aortic valve with normal opening.  14. No aortic valve stenosis.  15. There is no evidence of pericardial effusion.       ANAMIKA DANISH  [id number]    Interval events: Pt denies palpitations, chest pain or shortness of breath. NSR on tele    HPI:  68-year-old female with PMHx CREST syndrome, HLD, HTN admitted for visual disturbance found to have a CVA. Presenting with blurred vision. On Friday after dinner, started having double vision (vertically). This improved and on Saturday, was having blurred vision of the outer half of her right eye. This gradually improved over the weekend. Was seen today by her Ophthamologist and was sent to ED to r/o CVA/TIA. Upon admission, she is still with slight blurring of her outer right visual field. Denies any chest pain, palpitations, dizziness, syncope or other complaints.     Family at bedside, all questions answered.  (2023 14:21)      ALLERGIES:  No Known Allergies      PAST MEDICAL & SURGICAL HISTORY:  HTN (hypertension)  HLD (hyperlipidemia)  Calcinosis, Raynaud's phenomenon, sclerodactyly, and telangiectasia (CREST) syndrome  HNP (herniated nucleus pulposus), lumbar  History of pleural effusion  Heart murmur  H/O  section  S/P cervical discectomy      CURRENT MEDICATIONS:  MEDICATIONS  (STANDING):  apixaban 5 milliGRAM(s) Oral every 12 hours  aspirin enteric coated 162 milliGRAM(s) Oral daily  atorvastatin 80 milliGRAM(s) Oral at bedtime  metoprolol succinate ER 50 milliGRAM(s) Oral daily  multivitamin/minerals/iron Oral Solution (CENTRUM) 15 milliLiter(s) Oral daily  pantoprazole    Tablet 40 milliGRAM(s) Oral before breakfast    MEDICATIONS  (PRN):  acetaminophen     Tablet .. 650 milliGRAM(s) Oral every 6 hours PRN Mild Pain (1 - 3), Moderate Pain (4 - 6), Severe Pain (7 - 10)  aluminum hydroxide/magnesium hydroxide/simethicone Suspension 30 milliLiter(s) Oral every 4 hours PRN Dyspepsia  ondansetron Injectable 4 milliGRAM(s) IV Push every 8 hours PRN Nausea and/or Vomiting      SOCIAL HISTORY: former smoker 26 years ago      FAMILY HISTORY:  Family history of diabetes mellitus in sister  FHx: heart disease parents  Family history of abdominal aortic aneurysm (AAA) mother    ROS:  All 10 systems reviewed and positives noted in HPI    OBJECTIVE:    VITAL SIGNS:  Vital Signs Last 24 Hrs  T(C): 36.2 (2023 11:18), Max: 36.6 (2023 20:55)  T(F): 97.2 (2023 11:18), Max: 97.8 (2023 20:55)  HR: 101 (2023 11:18) (62 - 101)  BP: 125/94 (2023 11:18) (125/94 - 164/66)  BP(mean): --  RR: 16 (2023 11:18) (16 - 18)  SpO2: 96% (2023 11:18) (96% - 97%)    Parameters below as of 2023 11:18  Patient On (Oxygen Delivery Method): room air      PHYSICAL EXAM:  General: well appearing, no distress  HEENT: sclera anicteric  Neck: supple, no carotid bruits b/l  CVS: JVP ~ 7 cm H20, RRR, s1, s2, no murmurs/rubs  Chest: unlabored respirations, clear to auscultation b/l  Abdomen: obese  Extremities: no lower extremity edema b/l  Neuro: awake, alert & oriented  Psych: normal affect      LABS:                        14.0   6.88  )-----------( 202      ( 2023 06:21 )             41.8     11-14    133<L>  |  98  |  24<H>  ----------------------------<  84  3.8   |  25  |  1.08    Ca    9.8      2023 06:21    TPro  8.4<H>  /  Alb  3.9  /  TBili  0.5  /  DBili  x   /  AST  35  /  ALT  25  /  AlkPhos  84  11-13        PT/INR - ( 2023 14:20 )   PT: 11.9 sec;   INR: 1.02 ratio         PTT - ( 2023 14:20 )  PTT:36.9 sec      ECG: NSR    TTE: < from: TTE Echo Complete w/o Contrast w/ Doppler (23 @ 08:20) >   1. The heart rhythm is irregular throughout the study.   2. Normal global left ventricular systolic function.   3. Left ventricular ejection fraction, by visual estimation, is 65 to   70%.   4. Increased LV wall thickness.   5. Normal left ventricular internal cavity size.   6. Left ventricle chordal calcification.   7. The mitral in-flow pattern reveals no discernable A-wave, therefore   no comment on diastolic function can be made.   8. Normal right ventricular size and function.   9. The left atrium is normal in size.  10. The right atrium is normal in size.  11. Mild thickening of the anterior mitral valve leaflet.  12. Trace mitral valve regurgitation.  13. Sclerotic aortic valve with normal opening.  14. No aortic valve stenosis.  15. There is no evidence of pericardial effusion.

## 2023-11-15 NOTE — PROGRESS NOTE ADULT - PROBLEM SELECTOR PLAN 1
- Acute w/ residual R peripheral visual field deficit  - CT showing acute/subacute left-sided PCA distribution infarction affecting the left posteromedial temporal and left paramedian occipital lobes with associated cytotoxic edema  - CTA showing normal CTA of the neck. Occlusion of the distal calcarine portion of the left posterior cerebral artery  - MRI showing acute L PCA infarct  - A1c 5.8, lipid panel WNL  - Echo: EF 65-70%, trace MVR, sclerotic aortic valve, no AS  - C/w atorvastatin 80mg QD  - F/U CT head noncont this AM  - Appreciate Neuro recs   - Appreciate PT and Speech recs - Acute w/ residual R peripheral visual field deficit  - CT showing acute/subacute left-sided PCA distribution infarction affecting the left posteromedial temporal and left paramedian occipital lobes with associated cytotoxic edema  - CTA showing normal CTA of the neck. Occlusion of the distal calcarine portion of the left posterior cerebral artery.  - MRI showing acute L PCA infarct  - A1c 5.8, lipid panel WNL  - Echo: EF 65-70%, trace MVR, sclerotic aortic valve, no AS  - C/w atorvastatin 80mg QD  - F/U CT head noncon this AM  - Appreciate Neuro recs   - Appreciate PT/OT recs - no needs

## 2023-11-15 NOTE — OCCUPATIONAL THERAPY INITIAL EVALUATION ADULT - NSACTIVITYREC_GEN_A_OT
Pt. is independent with activities, with occasional difficulty with socks.  Provide support as needed.

## 2023-11-15 NOTE — PROGRESS NOTE ADULT - PROBLEM SELECTOR PLAN 3
- Chronic   - Home med HCTZ 25mg QD, held   - C/w metoprolol 50mg QD  - Monitor vitals - Chronic   - Home med HCTZ 25mg QD, held   - C/w metoprolol 50mg QD  - Start amlodipine 5mg QD  - Monitor vitals

## 2023-11-15 NOTE — OCCUPATIONAL THERAPY INITIAL EVALUATION ADULT - MD ORDER
CVA- Acute/Subacute Left Sided PCA Distribution Infraction; Posteromedial Temporal; Left Paramedia Occipital Lobe, Cytotoxic Edema

## 2023-11-15 NOTE — DISCHARGE NOTE NURSING/CASE MANAGEMENT/SOCIAL WORK - PATIENT PORTAL LINK FT
You can access the FollowMyHealth Patient Portal offered by [facility] by registering at the following website: [email or web address]. By joining [employer]’s FollowMyHealth portal, you will also be able to view your health information using other applications (apps) compatible with our system.

## 2023-11-15 NOTE — PROGRESS NOTE ADULT - PROBLEM SELECTOR PLAN 4
- Chronic  - Home med atorvastatin 40mg   - Lipid profile WNL  - C/w atorvastatin 80mg QHS
- Chronic  - Home med atorvastatin 40mg   - Lipid profile WNL  - C/w atorvastatin 80mg QHS

## 2023-11-15 NOTE — PROGRESS NOTE ADULT - ATTENDING COMMENTS
Admitted for PCA territory infarct. Known A-fib (5 years per patient), not on AC  Cardio and Neuro evals appreciated     Discussed risks/benefits of AC with patient and family at bedside. Agreeable to start Eliquis.
repeat CTH no change     Tolerating Eliquis. Lipid panel reviewed. DC HCTZ d/t hyponatremia. start norvasc  No home needs    DC home this afternoon

## 2023-11-15 NOTE — PROGRESS NOTE ADULT - ASSESSMENT
Patient is a 68-year-old female with PMHx CREST syndrome, HLD, HTN admitted for visual disturbance found to have a CVA, Cardiology and Neuro in agreement on starting eliquis given afib, pending CT head this morning with possible discharge today.    Discussed with attending, Dr. Medrano Patient is a 68-year-old female with PMHx CREST syndrome, HLD, HTN admitted for visual disturbance found to have a CVA, Cardiology and Neuro in agreement on starting eliquis given afib, pending CT head this morning with possible discharge today.     Discussed with attending, Dr. Medrano

## 2023-11-15 NOTE — OCCUPATIONAL THERAPY INITIAL EVALUATION ADULT - PERTINENT HX OF CURRENT PROBLEM, REHAB EVAL
69yo female with PMHx as below presenting with blurred vision. On friday 11/10, after dinner, started having double vision (vertically). This improved and on Saturday 11/11, was having blurred vision of the outer half of her right eye. This gradually improved over the weekend. Was seen 11/13 by her optho and was sent to ED to r/o cva/tia. Upon admission, pt continued to report slight blurring of her outer right visual field. Denies any cp, palpitations, dizziness, syncope or other complaints.

## 2023-11-15 NOTE — PROGRESS NOTE ADULT - ASSESSMENT
Assessment: 68-year-old female with PMHx CREST syndrome, HLD, HTN, GERD admitted for visual disturbance found to have a CVA. Cardiology consulted for Atrial fibrillation in setting of CVA. Patient endorses feeling palpitations earlier today, no shortness of breath. She also endorses pain in the epigastric/ chest area that came on after eating spicy soup, non-radiating. Admission EKG with NSR. CTA of the neck with occlusion of the distal portion of the left posterior cerebral artery. MRI confirming an acute left PCA infarct.    Recommendations:  Afib: TTE with EF 65-70% and irregular heart rhythm however, Pt NSR on tele today. Agree with Eliquis and can continue statin and beta blocker. Chest pain likely GI related as its onset was post meal and patient is usually on omeprazole but has not taken it for a few days.    Pt is CV stable for discharge today and can followup with her cardiologist Dr Alfonso.     Radha ISLASCNP-BC

## 2023-11-15 NOTE — OCCUPATIONAL THERAPY INITIAL EVALUATION ADULT - ADDITIONAL COMMENTS
Pt. is a 68 yr old, Right Hand Dominant, female who was admitted on 11/13 to [place] due to intermittent blurriness in eyes starting on Friday 11/10. Pt. lives at home with , in private home ([employer]), 1 RAH from front or 3 RAH with 1 Rail from Side door. Bedroom and Full Bath (Sliding doors, tub, hand held shower head) on 1st floor. Pt. drives at baseline, educated to await clearance prior to returning to driving. Pt's laundry in basement, and additional bedrooms on 2nd floor.

## 2023-11-15 NOTE — OCCUPATIONAL THERAPY INITIAL EVALUATION ADULT - NSOTDMEREC_GEN_A_CORE
Pt. already has shower chair and hand held shower at home.  Pt. does not utilize showerchair at baseline.

## 2023-11-15 NOTE — DISCHARGE NOTE NURSING/CASE MANAGEMENT/SOCIAL WORK - NSDCFUADDAPPT_GEN_ALL_CORE_FT
Stroke Neurology. [facility], [phone], November 20, 4 PM, Tita Paredes NP. Stroke Neurology. [address] [place] [address], [phone], November 20, 4 PM, Tita Paredes NP.  We made you a hospital followup appointment with Dr. Kurzweil on 11/17/23 at 4:45pm.

## 2023-11-15 NOTE — PROGRESS NOTE ADULT - ASSESSMENT
Patient is a 68 year old woman admitted today, 11/13/23.  She states  friday, 11/10/23 she noted  double vision with images vertical. This resolved and the next day, she noted blurring of the far right visual field. She notes an intermittent mild frontal headache which resolves with tylenol. She denies other associated neurological complaints. She denies fever chest pain, trauma. She states she has continued to improve with minimal blurring of the far right visual field. She saw an ophthalmalogist, Dr. Guzman, and referred to hospital for stroke evaluation. Neurological exam as above. Would be concerned with acute cerebral ischemia in the area seen on CT Head, Left PCA distribution.    1) CT head as above acute/subacute PCA distribution infarction left posteromedial temporal and left paramedian occipital lobes with cytotoxic edema. No hemorrhagic transformation.  2) CTA head and neck as above occlusion of the distal portion of the left PCA. Normal CTA neck  3) Echocardiogram as above without thrombus  4) MRI Head as above abnormal  T2 prolongation and restricted diffusion posterior left temporal and medical occipital cortex compatible with acute left PCA infarcts. No hemorrhagic transformation  5) Telemetry today noted atrial fibrillation. Appreciate Cardiology consult. Agree with anticoagulation with eliquis for stroke prophylaxis. Would not continue ASA in addition to eliquis for stroke prophylaxis.  6) CT Head, 11/15/23 as above no acute intracranial hemorrhage. Stable exam, acute/subacute Left PCA infarct   7) Outpatient appointment with Stroke Neurology. [address] [place] [address], [phone], November 20, 4 PM, Tita Paredes NP. Spoke with Lola.

## 2023-11-15 NOTE — OCCUPATIONAL THERAPY INITIAL EVALUATION ADULT - BATHING, PREVIOUS LEVEL OF FUNCTION, OT EVAL
Pt. has shower chair available at home in basement, if needed.  However, pt. does not utilize at baseline./independent

## 2023-11-15 NOTE — PROGRESS NOTE ADULT - SUBJECTIVE AND OBJECTIVE BOX
Patient is a 68 year old woman admitted today, 11/13/23.  She states  friday, 11/10/23 she noted  double vision with images vertical. This resolved and the next day, she noted blurring of the far right visual field. She notes an intermittent mild frontal headache which resolves with tylenol. She denies other associated neurological complaints. She denies fever chest pain, trauma. She states she has continued to improve with minimal blurring of the far right visual field. She saw an ophthalmalogist, Dr. Guzman, and referred to hospital for stroke evaluation. Today, Wednesday  she continues to improve with slight blurring of the far right visual field.     PMH: HLD          HTN          Lumbar disc disease         S/P cervical discectomy 2000    SH: No allergy    Exam: Awake, alert, appropriate           Pupils 2.5mm, EOM intact, no nystagmus, VFF           CN II-XII intact           Motor tone and strength  RUE  5/5      LUE   5/5                                                  RLE  5/5      LLE   5/5                          14.8   9.23  )-----------( 203      ( 13 Nov 2023 13:45 )             43.7   11-13    133<L>  |  99  |  27<H>  ----------------------------<  114<H>  4.6   |  25  |  1.06    Ca    9.9      13 Nov 2023 13:45    TPro  8.4<H>  /  Alb  3.9  /  TBili  0.5  /  DBili  x   /  AST  35  /  ALT  25  /  AlkPhos  84  11-13      Lipid Profile in AM (11.14.23 @ 06:21)    Cholesterol: 129: Interpretive Comment:  Acceptable: <200 mg/dL (for adults) ; <170 mg/dL (for children)  Borderline: 200-239 mg/dL (for adults) ; 170-199 mg/dL (for children)  High: >=240 mg/dL (for adults) ; >=200 mg/dL (for children) mg/dL   Triglycerides, Serum: 89 mg/dL   HDL Cholesterol: 56: Interpretive Comment:  HDL cholesterol less than 40 mg/dL is a risk factor for cardiovascular  disease mg/dL   Non HDL Cholesterol: 73: Interpretive Comment:  Non-HDL cholesterol is a secondary target of therapy in persons with high  serum triglycerides (> 199 mg/dL). The goal for non-HDL cholesterol in  persons with high triglycerides is 30 mg/dL higher than their LDL  cholesterol goal.  Acceptable: < 130 mg/dL (for adults) ; <120 mg/dL (for children)  Borderline: 130-159 mg/dL (for adults) ; 120-144 mg/dL (for children)  High: >=160 mg/dL (for adults) ; >=145 mg/dL (for children)  The suggested cutoff points are based on recommendations from the  American College of Cardiology/American Heart Association (ACC/AHA)  guidelines on the management of blood cholesterol Circulation.  2019;139:k2638-y4686, the National Cholesterol Education Program JAQUELINE.  2001;285(19):0990-6417, and the expert panel on integrated guidelines  for cardiovascular health and risk reduction in children and adolescents  Pediatrics. 2011;128 Suppl 5(Suppl 5):T062-O254. mg/dL   LDL Cholesterol Calculated: 56: Interpretive Comment:  The calculation for LDL cholesterol is based on the Salvador/NIH equation  (JAQUELINE Cardiol. 2020;5(5):540–548. doi:10.1001/jamacardio.2020.0013). The  acceptable LDL cholesterol concentration may vary based on cardiovascular  disease risk and treatment goals.  Acceptable: <130 mg/dL (for adults) ; <110 mg/dL (for children)  Borderline: 130-159 mg/dL (for adults) ; 110-129 mg/dL (for children)  High: >=160 mg/dL (for adults) ; >=130 mg/dL (for children) mg/dL    < from: CT Head No Cont (11.15.23 @ 10:29) >    Parenchymal volume loss is identified    Abnormal low-attenuation involving the left posterior medial temporal and   medial left occipital region is again seen. This is compatible with an   acute to subacute left PCA infarct. No hemorrhagic transformation is   seen. No significant shift or herniation is seen.    Evaluation of the osseous structures with the appropriate window appears   unremarkable    The visualized paranasal sinuses mastoid and middle ear regions appear   clear.    IMPRESSION: Stable exam.    --- End of Report ---            NIKOLE BOGGS MD; Attending Radiologist  This document has been electronically signed. Nov 15 2023 10:33AM    < end of copied text >        < from: CT Head No Cont (11.13.23 @ 13:44) >    FINDINGS: Hypoattenuation is seen involving the left paramedian temporal   and occipital lobes and the PCA distribution with sulcal effacement. No   hemorrhagic transformation is seen.    There is no acute intracranial hemorrhage, shift of the midline   structures, herniation, or hydrocephalus.    The paranasal sinuses and tympanomastoid cavities are clear. The   calvarium appears intact. The orbits appear unremarkable.    IMPRESSION: Acute/subacute left-sided PCA distribution infarction   affecting the left posteromedial temporal and left paramedian occipital   lobes with associated cytotoxic edema. No hemorrhagic transformation.    Dr. Walker Kowalski discussed findings with LUIS F Hairston on 11/13/2023 at 1:45 PM    --- End of Report ---            WALKER KOWALSKI MD; Attending Radiologist  This document has been electronically signed. Nov 13 2023  1:47PM      < from: TTE Echo Complete w/o Contrast w/ Doppler (11.14.23 @ 08:20) >    Summary:   1. The heart rhythm is irregular throughout the study.   2. Normal global left ventricular systolic function.   3. Left ventricular ejection fraction, by visual estimation, is 65 to   70%.   4. Increased LV wall thickness.   5. Normal left ventricular internal cavity size.   6. Left ventricle chordal calcification.   7. The mitral in-flow pattern reveals no discernable A-wave, therefore   no comment on diastolic function can be made.   8. Normal right ventricular size and function.   9. The left atrium is normal in size.  10. The right atrium is normal in size.  11. Mild thickening of the anterior mitral valve leaflet.  12. Trace mitral valve regurgitation.  13. Sclerotic aortic valve with normal opening.  14. No aortic valve stenosis.  15. There is no evidence of pericardial effusion.    [id number] Scottie Smith MD Electronically signed on   11/14/2023 at 2:44:14 PM        < from: CT Angio Head w/ IV Cont (11.13.23 @ 16:44) >    The origins of the carotid and vertebral arteries are normal. The right   vertebral artery is dominant. The carotid bifurcations demonstrate   calcification without significant stenosis.      The distal vertebral arteries are well identified as are the   posterior-inferior cerebellar arteries bilaterally. The region of the   vertebral basilar junction is normal. The basilar artery is normal. The   proximal posterior cerebral and superior cerebellar arteries are normal.  There is a cutoff of the distal calcarine portion of the left posterior   cerebral artery consistent with a recent infarction    Evaluation of the carotid arteries demonstrate normal appearance to the   the distal cervical, petrous, cavernous and supraclinoid internal carotid   arteries. The anterior cerebral arteries anterior communicating artery   and middle cerebral arteries are normal.    The normal intracranial venous circulation is identified. The right   transverse sinus is dominant. The superior sagittal sinus, internal   cerebral veins, vein of Mihir, straight sinus, transverse sinuses,   sigmoid sinuses and internal jugular veins are normal. Cortical veins are   normal.    There is an anterior cervical discectomy with interposition graft and   anterior metallic plating at the C5-6 level.        IMPRESSION: Normal CTA of the neck. Occlusion of the distal calcarine   portion of the left posterior cerebral artery.        ARLEEN ALEMAN MD; Attending Radiologist    < from: MR Head No Cont (11.14.23 @ 13:41) >    This exam is compared with prior head CT performed on November 13, 2023.    The lateral ventricles have a normal configured    There is abnormal T2 prolongation with restricted diffusion seen   involving posterior left temporal and medialoccipital cortex. These   findings are compatible with acute left PCA infarcts. No hemorrhagic   transformation is seen. No significant shift or herniation is seen.    The large vessels demonstrate normal flow voids    The visualized paranasal sinuses mastoid and middle ear regions appear   clear.    IMPRESSION: Acute left PCA infarct.    --- End of Report ---            NIKOLE BOGGS MD; Attending Radiologist  This document has been electronically signed. Nov 14 2023  1:53PM    < end of copied text >        This document has been electronically signed. Nov 13 2023  4:52PM    < end of copied text >

## 2023-11-15 NOTE — PROGRESS NOTE ADULT - PROBLEM SELECTOR PLAN 2
- Acute, new onset   - Afib noted by telemetry tech monitoring today   - Not on AC  - CHADVASC 3   - F/U echo  - Appreciate Cardiology consult
- Acute, new onset   - Afib noted by telemetry tech monitoring  - CHADVASC 3   - Echo: EF 65-70%, trace MVR, sclerotic aortic valve, no AS  - C/w eliquis 5mg BID  - Appreciate Cardiology consult

## 2023-11-15 NOTE — PROGRESS NOTE ADULT - NS ATTEND AMEND GEN_ALL_CORE FT
I have seen and examined the patient. I have discussed case with HEAVEN Millan.    Ngozi Charlton is a 68 year old woman with past medical history of Atrial fibrillation (on Aspirin), Hypertension, Hyperlipidemia, Obesity and CREST syndrome who presented with double vision, found to have acute left PCA stroke.    ECG on presentation consistent with sinus rhythm and delayed R wave progression. Telemetry initially was consistent with atrial fibrillation 110s BPM, now sinus. Echo consistent with normal LVEF 65-70%, normal RV size and function, no significant valvular disease. The patient reports that she follows with a cardiologist and has had occasional palpitations and episodes of atrial fibrillation for years and has been prescribed Aspirin 162 mg daily. Overall, patient appears to have had acute left PCA stroke, possible etiology thromboembolic from atrial fibrillation, but will defer etiology of CVA to Neurology. Due to CVA and elevated [id number] score agree with anticoagulation for stroke prophylaxis, such as Eliquis but confirm with Neurology first when safe to start given risk for hemorrhagic conversion with recent stroke. Patient has been started on Eliquis and tolerating it well. Continue Metoprolol. There is no cardiac indication for Aspirin, would defer this to Neurology - Neurology recommends to discontinue Aspirin. Agree with high intensity statin. Recommend outpatient follow up with her cardiologist in 3-4 weeks. Patient and family at bedside agree with plan. All questions answered.    We will sign off, please re-consult if needed.    Scottie Smith MD  Cardiology

## 2023-11-15 NOTE — PROGRESS NOTE ADULT - SUBJECTIVE AND OBJECTIVE BOX
Patient is a 68-year-old female with PMHx CREST syndrome, HLD, HTN admitted for visual disturbance found to have a CVA.    Overnight Events: None  Interval HPI: Patient seen and examined at bedside.     REVIEW OF SYSTEMS:  CONSTITUTIONAL: (-) weakness, (-) fevers, (-) chills  EYES/ENT: (-) visual changes,  (-) vertigo,  (-) throat pain   NECK:  (-) pain, (-) stiffness  RESPIRATORY:  (-) shortness of breath, (-) cough,  (-) wheezing,  (-) hemoptysis   CARDIOVASCULAR:  (-) chest pain, (-) palpitations  GASTROINTESTINAL:  (-) abdominal or epigastric pain, (-) nausea, (-) vomiting, (-) diarrhea, (-) constipation, (-) melena,  (-) hematemesis,  (-) hematochezia  GENITOURINARY: (-) dysuria, (-) frequency, (-) hematuria  NEUROLOGICAL: (-) numbness, (-) weakness  SKIN: (-) itching, (-) rashes, (-) lesions    Vital Signs Last 24 Hrs  T(C): 36.3 (15 Nov 2023 05:22), Max: 36.6 (14 Nov 2023 21:46)  T(F): 97.4 (15 Nov 2023 05:22), Max: 97.9 (14 Nov 2023 21:46)  HR: 56 (15 Nov 2023 05:22) (56 - 101)  BP: 156/80 (15 Nov 2023 05:22) (125/94 - 162/69)  BP(mean): --  RR: 17 (15 Nov 2023 05:22) (16 - 17)  SpO2: 97% (15 Nov 2023 05:22) (96% - 99%)    Parameters below as of 15 Nov 2023 05:22  Patient On (Oxygen Delivery Method): room air      PHYSICAL EXAM:  GENERAL: NAD, lying in bed comfortably  HEAD:  Atraumatic, Normocephalic  EYES: EOMI, conjunctiva and sclera clear  ENT: Moist mucous membranes  NECK: Supple  CHEST/LUNG: Clear to auscultation bilaterally, good air entry bilaterally; No wheezing, rales, or rhonchi. Unlabored respirations  HEART: Regular rate and rhythm. S1 and S2. No murmurs, rubs, or gallops  ABDOMEN: Soft, Nontender, Nondistended. Bowel sounds present.   EXTREMITIES:  2+ Peripheral Pulses. No clubbing, cyanosis, or edema  NERVOUS SYSTEM:  Alert & Oriented X3, speech clear. No deficits.  MSK: FROM all 4 extremities, full and equal strength  SKIN: No rashes, bruises, or other lesions    MEDICATIONS:  MEDICATIONS  (STANDING):  apixaban 5 milliGRAM(s) Oral every 12 hours  atorvastatin 80 milliGRAM(s) Oral at bedtime  metoprolol succinate ER 50 milliGRAM(s) Oral daily  multivitamin/minerals/iron Oral Solution (CENTRUM) 15 milliLiter(s) Oral daily  pantoprazole    Tablet 40 milliGRAM(s) Oral before breakfast    MEDICATIONS  (PRN):  acetaminophen     Tablet .. 650 milliGRAM(s) Oral every 6 hours PRN Mild Pain (1 - 3), Moderate Pain (4 - 6), Severe Pain (7 - 10)  aluminum hydroxide/magnesium hydroxide/simethicone Suspension 30 milliLiter(s) Oral every 4 hours PRN Dyspepsia  ondansetron Injectable 4 milliGRAM(s) IV Push every 8 hours PRN Nausea and/or Vomiting      LABS:   All Labs Personally Reviewed                          13.5   6.61  )-----------( 179      ( 15 Nov 2023 06:00 )             39.3     15 Nov 2023 06:00    129    |  93     |  29     ----------------------------<  92     4.4     |  27     |  1.20     Ca    9.1        15 Nov 2023 06:00  Mg     1.8       15 Nov 2023 06:00    TPro  8.4    /  Alb  3.9    /  TBili  0.5    /  DBili  x      /  AST  35     /  ALT  25     /  AlkPhos  84     13 Nov 2023 13:45    PT/INR - ( 13 Nov 2023 14:20 )   PT: 11.9 sec;   INR: 1.02 ratio         PTT - ( 13 Nov 2023 14:20 )  PTT:36.9 sec  CAPILLARY BLOOD GLUCOSE        LIVER FUNCTIONS - ( 13 Nov 2023 13:45 )  Alb: 3.9 g/dL / Pro: 8.4 g/dL / ALK PHOS: 84 U/L / ALT: 25 U/L / AST: 35 U/L / GGT: x           Urinalysis Basic - ( 15 Nov 2023 06:00 )    Color: x / Appearance: x / SG: x / pH: x  Gluc: 92 mg/dL / Ketone: x  / Bili: x / Urobili: x   Blood: x / Protein: x / Nitrite: x   Leuk Esterase: x / RBC: x / WBC x   Sq Epi: x / Non Sq Epi: x / Bacteria: x      RADIOLOGY/EKG:  All Imaging and EKGs Personally Reviewed     TTE Echo Complete w/o Contrast w/ Doppler (11.14.23 @ 08:20)   Summary:   1. The heart rhythm is irregular throughout the study.   2. Normal global left ventricular systolic function.   3. Left ventricular ejection fraction, by visual estimation, is 65 to   70%.   4. Increased LV wall thickness.   5. Normal left ventricular internal cavity size.   6. Left ventricle chordal calcification.   7. The mitral in-flow pattern reveals no discernable A-wave, therefore   no comment on diastolic function can be made.   8. Normal right ventricular size and function.   9. The left atrium is normal in size.  10. The right atrium is normal in size.  11. Mild thickening of the anterior mitral valve leaflet.  12. Trace mitral valve regurgitation.  13. Sclerotic aortic valve with normal opening.  14. No aortic valve stenosis.  15. There is no evidence of pericardial effusion.    CT Head No Cont (11.13.23 @ 13:44)  IMPRESSION: Acute/subacute left-sided PCA distribution infarction   affecting the left posteromedial temporal and left paramedian occipital   lobes with associated cytotoxic edema. No hemorrhagic transformation.    Xray Chest 1 View- PORTABLE-Urgent (11.13.23 @ 14:39)   Impression:  No acute pulmonary disease.    CT Angio Head w/ IV Cont (11.13.23 @ 16:44)   IMPRESSION: Normal CTA of the neck. Occlusion of the distal calcarine   portion of the left posterior cerebral artery.    MR Head No Cont (11.14.23 @ 13:41)   IMPRESSION: Acute left PCA infarct.         Patient is a 68-year-old female with PMHx CREST syndrome, HLD, HTN admitted for visual disturbance found to have a CVA.    Overnight Events: None  Interval HPI: Patient seen and examined at bedside. Peripheral vision improved, denies any headache, chest pain, SOB. Denies pain, tolerating diet, voiding appropriately. Eliquis started last night. Discussed plan for repeat CT head today, and urine labs for sodium. Pending results can likely go home today. No other questions or concerns at this time.     REVIEW OF SYSTEMS:  CONSTITUTIONAL: (-) weakness, (-) fevers, (-) chills  EYES/ENT: (-) visual changes,  (-) vertigo,  (-) throat pain   NECK:  (-) pain, (-) stiffness  RESPIRATORY:  (-) shortness of breath, (-) cough,  (-) wheezing,  (-) hemoptysis   CARDIOVASCULAR:  (-) chest pain, (-) palpitations  GASTROINTESTINAL:  (-) abdominal or epigastric pain, (-) nausea, (-) vomiting, (-) diarrhea, (-) constipation, (-) melena,  (-) hematemesis,  (-) hematochezia  GENITOURINARY: (-) dysuria, (-) frequency, (-) hematuria  NEUROLOGICAL: (-) numbness, (-) weakness  SKIN: (-) itching, (-) rashes, (-) lesions    Vital Signs Last 24 Hrs  T(C): 36.3 (15 Nov 2023 05:22), Max: 36.6 (14 Nov 2023 21:46)  T(F): 97.4 (15 Nov 2023 05:22), Max: 97.9 (14 Nov 2023 21:46)  HR: 56 (15 Nov 2023 05:22) (56 - 101)  BP: 156/80 (15 Nov 2023 05:22) (125/94 - 162/69)  BP(mean): --  RR: 17 (15 Nov 2023 05:22) (16 - 17)  SpO2: 97% (15 Nov 2023 05:22) (96% - 99%)    Parameters below as of 15 Nov 2023 05:22  Patient On (Oxygen Delivery Method): room air      PHYSICAL EXAM:  GENERAL: NAD, lying in bed comfortably  HEAD:  Atraumatic, Normocephalic  EYES: EOMI, conjunctiva and sclera clear  ENT: Moist mucous membranes  NECK: Supple  CHEST/LUNG: Clear to auscultation bilaterally, good air entry bilaterally; No wheezing, rales, or rhonchi. Unlabored respirations  HEART: Regular rate and rhythm. S1 and S2. No murmurs, rubs, or gallops  ABDOMEN: Soft, Nontender, Nondistended. Bowel sounds present.   EXTREMITIES:  2+ Peripheral Pulses. No clubbing, cyanosis, or edema  NERVOUS SYSTEM:  Alert & Oriented X3, speech clear. No deficits.  SKIN: No rashes, bruises, or other lesions    MEDICATIONS:  MEDICATIONS  (STANDING):  apixaban 5 milliGRAM(s) Oral every 12 hours  atorvastatin 80 milliGRAM(s) Oral at bedtime  hydrochlorothiazide 25 milliGRAM(s) Oral daily  metoprolol succinate ER 50 milliGRAM(s) Oral daily  multivitamin/minerals/iron Oral Solution (CENTRUM) 15 milliLiter(s) Oral daily  pantoprazole    Tablet 40 milliGRAM(s) Oral before breakfast    MEDICATIONS  (PRN):  acetaminophen     Tablet .. 650 milliGRAM(s) Oral every 6 hours PRN Mild Pain (1 - 3), Moderate Pain (4 - 6), Severe Pain (7 - 10)  aluminum hydroxide/magnesium hydroxide/simethicone Suspension 30 milliLiter(s) Oral every 4 hours PRN Dyspepsia  ondansetron Injectable 4 milliGRAM(s) IV Push every 8 hours PRN Nausea and/or Vomiting      LABS:   All Labs Personally Reviewed                          13.5   6.61  )-----------( 179      ( 15 Nov 2023 06:00 )             39.3     15 Nov 2023 06:00    129    |  93     |  29     ----------------------------<  92     4.4     |  27     |  1.20     Ca    9.1        15 Nov 2023 06:00  Mg     1.8       15 Nov 2023 06:00    TPro  8.4    /  Alb  3.9    /  TBili  0.5    /  DBili  x      /  AST  35     /  ALT  25     /  AlkPhos  84     13 Nov 2023 13:45    PT/INR - ( 13 Nov 2023 14:20 )   PT: 11.9 sec;   INR: 1.02 ratio         PTT - ( 13 Nov 2023 14:20 )  PTT:36.9 sec  CAPILLARY BLOOD GLUCOSE    LIVER FUNCTIONS - ( 13 Nov 2023 13:45 )  Alb: 3.9 g/dL / Pro: 8.4 g/dL / ALK PHOS: 84 U/L / ALT: 25 U/L / AST: 35 U/L / GGT: x           Urinalysis Basic - ( 15 Nov 2023 06:00 )    Color: x / Appearance: x / SG: x / pH: x  Gluc: 92 mg/dL / Ketone: x  / Bili: x / Urobili: x   Blood: x / Protein: x / Nitrite: x   Leuk Esterase: x / RBC: x / WBC x   Sq Epi: x / Non Sq Epi: x / Bacteria: x      RADIOLOGY/EKG:  All Imaging and EKGs Personally Reviewed     TTE Echo Complete w/o Contrast w/ Doppler (11.14.23 @ 08:20)   Summary:   1. The heart rhythm is irregular throughout the study.   2. Normal global left ventricular systolic function.   3. Left ventricular ejection fraction, by visual estimation, is 65 to   70%.   4. Increased LV wall thickness.   5. Normal left ventricular internal cavity size.   6. Left ventricle chordal calcification.   7. The mitral in-flow pattern reveals no discernable A-wave, therefore   no comment on diastolic function can be made.   8. Normal right ventricular size and function.   9. The left atrium is normal in size.  10. The right atrium is normal in size.  11. Mild thickening of the anterior mitral valve leaflet.  12. Trace mitral valve regurgitation.  13. Sclerotic aortic valve with normal opening.  14. No aortic valve stenosis.  15. There is no evidence of pericardial effusion.    CT Head No Cont (11.13.23 @ 13:44)  IMPRESSION: Acute/subacute left-sided PCA distribution infarction   affecting the left posteromedial temporal and left paramedian occipital   lobes with associated cytotoxic edema. No hemorrhagic transformation.    Xray Chest 1 View- PORTABLE-Urgent (11.13.23 @ 14:39)   Impression:  No acute pulmonary disease.    CT Angio Head w/ IV Cont (11.13.23 @ 16:44)   IMPRESSION: Normal CTA of the neck. Occlusion of the distal calcarine   portion of the left posterior cerebral artery.    MR Head No Cont (11.14.23 @ 13:41)   IMPRESSION: Acute left PCA infarct.

## 2023-11-17 ENCOUNTER — NON-APPOINTMENT (OUTPATIENT)
Age: 68
End: 2023-11-17

## 2023-11-21 ENCOUNTER — APPOINTMENT (OUTPATIENT)
Dept: NEUROLOGY | Facility: CLINIC | Age: 68
End: 2023-11-21
Payer: MEDICARE

## 2023-11-21 VITALS — SYSTOLIC BLOOD PRESSURE: 181 MMHG | DIASTOLIC BLOOD PRESSURE: 80 MMHG | HEART RATE: 65 BPM

## 2023-11-21 DIAGNOSIS — I63.532 CEREBRAL INFARCTION DUE TO UNSPECIFIED OCCLUSION OR STENOSIS OF LEFT POSTERIOR CEREBRAL ARTERY: ICD-10-CM

## 2023-11-21 PROCEDURE — 99205 OFFICE O/P NEW HI 60 MIN: CPT

## 2023-12-19 ENCOUNTER — APPOINTMENT (OUTPATIENT)
Dept: OPHTHALMOLOGY | Facility: CLINIC | Age: 68
End: 2023-12-19
Payer: MEDICARE

## 2023-12-19 ENCOUNTER — NON-APPOINTMENT (OUTPATIENT)
Age: 68
End: 2023-12-19

## 2023-12-19 PROCEDURE — 92083 EXTENDED VISUAL FIELD XM: CPT

## 2023-12-19 PROCEDURE — 99204 OFFICE O/P NEW MOD 45 MIN: CPT

## 2023-12-19 PROCEDURE — 92133 CPTRZD OPH DX IMG PST SGM ON: CPT

## 2024-01-25 ENCOUNTER — APPOINTMENT (OUTPATIENT)
Dept: NEUROLOGY | Facility: CLINIC | Age: 69
End: 2024-01-25

## 2024-02-12 ENCOUNTER — APPOINTMENT (OUTPATIENT)
Dept: NEUROLOGY | Facility: CLINIC | Age: 69
End: 2024-02-12
Payer: MEDICARE

## 2024-02-12 VITALS
BODY MASS INDEX: 32.14 KG/M2 | HEIGHT: 66 IN | HEART RATE: 67 BPM | WEIGHT: 200 LBS | SYSTOLIC BLOOD PRESSURE: 171 MMHG | DIASTOLIC BLOOD PRESSURE: 80 MMHG

## 2024-02-12 DIAGNOSIS — Z86.73 PERSONAL HISTORY OF TRANSIENT ISCHEMIC ATTACK (TIA), AND CEREBRAL INFARCTION W/OUT RESIDUAL DEFICITS: ICD-10-CM

## 2024-02-12 PROCEDURE — 99205 OFFICE O/P NEW HI 60 MIN: CPT

## 2024-02-12 RX ORDER — CANDESARTAN CILEXETIL 32 MG/1
32 TABLET ORAL
Qty: 90 | Refills: 0 | Status: DISCONTINUED | COMMUNITY
Start: 2020-07-24 | End: 2024-02-12

## 2024-02-12 NOTE — PHYSICAL EXAM
[Oriented To Time, Place, And Person] : oriented to person, place, and time [Impaired Insight] : insight and judgment were intact [Affect] : the affect was normal [Mood] : the mood was normal [Memory Recent] : recent memory was not impaired [Memory Remote] : remote memory was not impaired [Person] : oriented to person [Short Term Intact] : short term memory intact [Concentration Intact] : normal concentrating ability [Fluency] : fluency intact [Comprehension] : comprehension intact [Current Events] : adequate knowledge of current events [Past History] : adequate knowledge of personal past history [Cranial Nerves Optic (II)] : visual acuity intact bilaterally,  visual fields full to confrontation, pupils equal round and reactive to light [Cranial Nerves Oculomotor (III)] : extraocular motion intact [Cranial Nerves Trigeminal (V)] : facial sensation intact symmetrically [Cranial Nerves Facial (VII)] : face symmetrical [Cranial Nerves Vestibulocochlear (VIII)] : hearing was intact bilaterally [Cranial Nerves Glossopharyngeal (IX)] : tongue and palate midline [Cranial Nerves Accessory (XI - Cranial And Spinal)] : head turning and shoulder shrug symmetric [Cranial Nerves Hypoglossal (XII)] : there was no tongue deviation with protrusion [Motor Strength] : muscle strength was normal in all four extremities [5] : ankle plantar flexion 5/5 [Sensation Tactile Decrease] : light touch was intact [Balance] : balance was intact [2+] : Ankle jerk left 2+ [Sclera] : the sclera and conjunctiva were normal [PERRL With Normal Accommodation] : pupils were equal in size, round, reactive to light, with normal accommodation [Extraocular Movements] : extraocular movements were intact [Full Visual Field] : full visual field [Hearing Threshold Finger Rub Not Crockett] : hearing was normal [Neck Appearance] : the appearance of the neck was normal [] : no respiratory distress [Auscultation Breath Sounds / Voice Sounds] : lungs were clear to auscultation bilaterally [Heart Rate And Rhythm] : heart rate was normal and rhythm regular [Heart Sounds] : normal S1 and S2 [Murmurs] : no murmurs [Abnormal Walk] : normal gait [Motor Tone] : muscle strength and tone were normal [Paresis Pronator Drift Right-Sided] : no pronator drift on the right [Paresis Pronator Drift Left-Sided] : no pronator drift on the left [Motor Strength Upper Extremities Bilaterally] : strength was normal in both upper extremities [Motor Strength Lower Extremities Bilaterally] : strength was normal in both lower extremities [Romberg's Sign] : Romberg's sign was negtive [Coordination - Dysmetria Impaired Finger-to-Nose Bilateral] : not present

## 2024-02-12 NOTE — ADDENDUM
[FreeTextEntry1] : I saw and examined the patient with Dr. Willard.    Patient endorses improvement in the visual deficit.  She saw an ophthalmologist who performed formal visual fields, and reported she has no restrictions for driving.  She is completely independent in ADL's.   She was evaluated by therapy at home, and was not recommended to continue with any therapy.    On exam:  Full visual fields, normal speech, language fluency.  No drift in upper extremities.  Normal gait and negative Rhomberg.   MRI brain images reviewed  AP: 69 year old woman with recently discovered afib on eliquis, L PCA stroke, CREST syndrome, HLD.   Etiology of stroke is cardioembolic in setting of afib.   Can obtain carotid duplex in 1 year.   Follow up with me in 1 year.

## 2024-02-12 NOTE — REVIEW OF SYSTEMS
[Eyesight Problems] : eyesight problems [Fever] : no fever [Chills] : no chills [Confused or Disoriented] : no confusion [Memory Lapses or Loss] : no memory loss [Decr. Concentrating Ability] : no decrease in concentrating ability [Difficulty with Language] : no ~M difficulty with language [Changed Thought Patterns] : no change in thought patterns [Repeating Questions] : no repeated questioning about recent events [Facial Weakness] : no facial weakness [Arm Weakness] : no arm weakness [Hand Weakness] : no hand weakness [Leg Weakness] : no leg weakness [Poor Coordination] : good coordination [Difficulties in Speech] : no speech difficulties [Numbness] : no numbness [Tingling] : no tingling [Abnormal Sensation] : no abnormal sensation [Dizziness] : no dizziness [Lightheadedness] : no lightheadedness [Vertigo] : no vertigo [Cluster Headache] : no cluster headache [Migraine Headache] : no migraine headache [Difficulty Walking] : no difficulty walking [Inability to Walk] : able to walk [Ataxia] : no ataxia [Chest Pain] : no chest pain [Palpitations] : no palpitations [Leg Claudication] : no intermittent leg claudication [Lower Ext Edema] : no lower extremity edema [Shortness Of Breath] : no shortness of breath

## 2024-02-12 NOTE — HISTORY OF PRESENT ILLNESS
[FreeTextEntry1] : Mr. Charlton is a 68 year old female PMHx HTN, HLD, scleroderma who presents today for follow up after an acute left PCA infarcts on 11/13/2023.  Patient was admitted 11/13/23, states that on 11/10/23 she noted double vision with images vertical. This resolved and the next day, she noted blurring of the far right visual field. She notes an intermittent mild frontal headache which resolves with Tylenol. She denies other associated neurological complaints. She denies fever chest pain, trauma. She states she has continued to improve with minimal blurring of the far right visual field. She saw an ophthalmologist, Dr. Guzman, and referred to hospital for stroke evaluation. On 11/13, she continued to improve with slight blurring of the far-right visual field. CTA head and neck as above occlusion of the distal portion of the left PCA. Normal CTA neck. MRI Head as above abnormal T2 prolongation and restricted diffusion posterior left temporal and medical occipital cortex compatible with acute left PCA infarcts.   Today she states vision in R field has significantly improved. Still has light sensitivity and HA. States HA became more frequent after stroke. Describes as dull preorbital with occasional "black dots" in visual fields, not a/w aura, photo/phonophobia, weakness, numbness/tingling, lacrimation, facial edema, ptosis. Does not awaken from sleep. Takes Tylenol at start of HA, which helps. Reports occasional word-finding difficulties. Denies confusion, difficulty walking, sensory changes.  has not noted any concerning changes since hospitalization either.   Does report blurry vision, not worse than before, but has glasses. Follows with [facility], told now has b/l cataracts.   Of note, had loop recorder placed 2 weeks ago with [facility] to evaluate AFib burden. Denies CP, palpitations, SOB, claudication, dizziness/vertigo. Has b/l ankle swelling, unchanged from prior.  She notes compliance with meds and has an upcoming appt with cardiology and ophthalmology. Of note, states she was given permission to drive from [place], but wants to wait until f/u with cardiology.  Patient is a former smoker (quit 1997) but has secondhand smoke exposure at home. Social alcohol use.  AOx3 EOMI, PERRLA, full visual fields CN II-XII otherwise intact Normal speech fluency Strength 5/5 throughout  Tactile sensation intact Reflexes brisk No pronator drift No ataxia Normal gait  Romberg negative

## 2024-02-12 NOTE — DISCUSSION/SUMMARY
[Antithrombotic therapy with ___] : antithrombotic therapy with  [unfilled] [Intensive Blood Pressure Control] : intensive blood pressure control [Lipid Lowering Therapy] : lipid lowering therapy [Patient encouraged to discuss with Primary MD] : I encouraged the patient to discuss these important issues with ~his/her~ primary care doctor [Goals and Counseling] : I have reviewed the goals of stroke risk factor modification. I counseled the patient on measures to reduce stroke risk, including the importance of medication compliance, risk factor control, exercise, healthy diet and avoidance of smoking. I reviewed stroke warning signs and symptoms and appropriate actions to take if such occur. [FreeTextEntry1] : Impression: acute left PCA infarcts secondary to PCA occlusion etiology likely new onset atrial fibrillation   Overall patient is neurologically stable. Continue Eliquis BID for afib. Continue to follow up with PCP/cardiology for BP and statin management (goal LDL <70) as well as all routine primary care needs. Referral placed for OT for visual therapy. Referral placed for neuro ophthalmology. Screened patient for sleep apnea with no identifiable risk factors at this time. Will reevaluate next visit. We discussed aggressive vascular strict risk factor control.  Follow up with me in 1-2 months and stroke MD at next available. Transcranial and Carotid Doppler's to be obtained at next appointment. Patient and family were educated on signs and symptoms of stroke and to contact 911 immediately if experiencing any.

## 2024-02-12 NOTE — ASSESSMENT
[FreeTextEntry1] : Mr. Charlton is a 68 year old female PMHx HTN, HLD, scleroderma who presents today for follow up after an acute left PCA infarcts on 11/13/2023.  #L PCA stroke - Likely embolic in nature; dx AFib at [facility] 11/2023 - Loop recorder placed 2 weeks ago, will f/u with [facility] cardiology  - c/w Eliquis 5 bid, atorva 40, candesartan 16, ezetimibe 10 - Markedly improved visual fields today - 11/2023 Normal CTA neck  - 11/2023 MRI Head as above abnormal T2 prolongation and restricted diffusion posterior left temporal and medical occipital cortex compatible with acute left PCA infarcts - F/u carotid duplex 2025 - RTC in 1 year  D/w Dr. Galvan

## 2024-04-19 ENCOUNTER — NON-APPOINTMENT (OUTPATIENT)
Age: 69
End: 2024-04-19

## 2024-04-19 ENCOUNTER — APPOINTMENT (OUTPATIENT)
Dept: OPHTHALMOLOGY | Facility: CLINIC | Age: 69
End: 2024-04-19
Payer: MEDICARE

## 2024-04-19 PROCEDURE — 99214 OFFICE O/P EST MOD 30 MIN: CPT

## 2024-04-19 PROCEDURE — 92133 CPTRZD OPH DX IMG PST SGM ON: CPT

## 2024-04-19 PROCEDURE — 92083 EXTENDED VISUAL FIELD XM: CPT

## 2024-07-04 ENCOUNTER — INPATIENT (INPATIENT)
Facility: HOSPITAL | Age: 69
LOS: 4 days | Discharge: HOME | DRG: 552 | End: 2024-07-09
Attending: STUDENT IN AN ORGANIZED HEALTH CARE EDUCATION/TRAINING PROGRAM | Admitting: INTERNAL MEDICINE
Payer: MEDICARE

## 2024-07-04 VITALS
OXYGEN SATURATION: 95 % | HEART RATE: 89 BPM | TEMPERATURE: 98 F | DIASTOLIC BLOOD PRESSURE: 81 MMHG | RESPIRATION RATE: 20 BRPM | HEIGHT: 65 IN | WEIGHT: 218.04 LBS | SYSTOLIC BLOOD PRESSURE: 102 MMHG

## 2024-07-04 DIAGNOSIS — Z98.890 OTHER SPECIFIED POSTPROCEDURAL STATES: Chronic | ICD-10-CM

## 2024-07-04 DIAGNOSIS — Z98.891 HISTORY OF UTERINE SCAR FROM PREVIOUS SURGERY: Chronic | ICD-10-CM

## 2024-07-04 DIAGNOSIS — M54.9 DORSALGIA, UNSPECIFIED: ICD-10-CM

## 2024-07-04 LAB
ALBUMIN SERPL ELPH-MCNC: 3.8 G/DL — SIGNIFICANT CHANGE UP (ref 3.3–5)
ALP SERPL-CCNC: 81 U/L — SIGNIFICANT CHANGE UP (ref 40–120)
ALT FLD-CCNC: 24 U/L — SIGNIFICANT CHANGE UP (ref 10–45)
ANION GAP SERPL CALC-SCNC: 9 MMOL/L — SIGNIFICANT CHANGE UP (ref 5–17)
APTT BLD: 48.3 SEC — HIGH (ref 24.5–35.6)
AST SERPL-CCNC: 28 U/L — SIGNIFICANT CHANGE UP (ref 10–40)
BASOPHILS # BLD AUTO: 0.04 K/UL — SIGNIFICANT CHANGE UP (ref 0–0.2)
BASOPHILS NFR BLD AUTO: 0.3 % — SIGNIFICANT CHANGE UP (ref 0–2)
BILIRUB SERPL-MCNC: 0.8 MG/DL — SIGNIFICANT CHANGE UP (ref 0.2–1.2)
BLD GP AB SCN SERPL QL: SIGNIFICANT CHANGE UP
BUN SERPL-MCNC: 40 MG/DL — HIGH (ref 7–23)
CALCIUM SERPL-MCNC: 9.7 MG/DL — SIGNIFICANT CHANGE UP (ref 8.4–10.5)
CHLORIDE SERPL-SCNC: 99 MMOL/L — SIGNIFICANT CHANGE UP (ref 96–108)
CO2 SERPL-SCNC: 24 MMOL/L — SIGNIFICANT CHANGE UP (ref 22–31)
CREAT SERPL-MCNC: 1.35 MG/DL — HIGH (ref 0.5–1.3)
EGFR: 42 ML/MIN/1.73M2 — LOW
EOSINOPHIL # BLD AUTO: 0.04 K/UL — SIGNIFICANT CHANGE UP (ref 0–0.5)
EOSINOPHIL NFR BLD AUTO: 0.3 % — SIGNIFICANT CHANGE UP (ref 0–6)
GLUCOSE SERPL-MCNC: 146 MG/DL — HIGH (ref 70–99)
HCT VFR BLD CALC: 41.1 % — SIGNIFICANT CHANGE UP (ref 34.5–45)
HGB BLD-MCNC: 13.7 G/DL — SIGNIFICANT CHANGE UP (ref 11.5–15.5)
IMM GRANULOCYTES NFR BLD AUTO: 0.4 % — SIGNIFICANT CHANGE UP (ref 0–0.9)
INR BLD: 1.65 RATIO — HIGH (ref 0.85–1.18)
LIDOCAIN IGE QN: 32 U/L — SIGNIFICANT CHANGE UP (ref 16–77)
LYMPHOCYTES # BLD AUTO: 0.81 K/UL — LOW (ref 1–3.3)
LYMPHOCYTES # BLD AUTO: 5.4 % — LOW (ref 13–44)
MAGNESIUM SERPL-MCNC: 1.8 MG/DL — SIGNIFICANT CHANGE UP (ref 1.6–2.6)
MCHC RBC-ENTMCNC: 32.9 PG — SIGNIFICANT CHANGE UP (ref 27–34)
MCHC RBC-ENTMCNC: 33.3 GM/DL — SIGNIFICANT CHANGE UP (ref 32–36)
MCV RBC AUTO: 98.6 FL — SIGNIFICANT CHANGE UP (ref 80–100)
MONOCYTES # BLD AUTO: 1.36 K/UL — HIGH (ref 0–0.9)
MONOCYTES NFR BLD AUTO: 9 % — SIGNIFICANT CHANGE UP (ref 2–14)
NEUTROPHILS # BLD AUTO: 12.83 K/UL — HIGH (ref 1.8–7.4)
NEUTROPHILS NFR BLD AUTO: 84.6 % — HIGH (ref 43–77)
NRBC # BLD: 0 /100 WBCS — SIGNIFICANT CHANGE UP (ref 0–0)
NT-PROBNP SERPL-SCNC: 3094 PG/ML — HIGH (ref 0–300)
PLATELET # BLD AUTO: 188 K/UL — SIGNIFICANT CHANGE UP (ref 150–400)
POTASSIUM SERPL-MCNC: 4.5 MMOL/L — SIGNIFICANT CHANGE UP (ref 3.5–5.3)
POTASSIUM SERPL-SCNC: 4.5 MMOL/L — SIGNIFICANT CHANGE UP (ref 3.5–5.3)
PROT SERPL-MCNC: 8.3 G/DL — SIGNIFICANT CHANGE UP (ref 6–8.3)
PROTHROM AB SERPL-ACNC: 19 SEC — HIGH (ref 9.5–13)
RBC # BLD: 4.17 M/UL — SIGNIFICANT CHANGE UP (ref 3.8–5.2)
RBC # FLD: 13.4 % — SIGNIFICANT CHANGE UP (ref 10.3–14.5)
SODIUM SERPL-SCNC: 132 MMOL/L — LOW (ref 135–145)
TROPONIN I, HIGH SENSITIVITY RESULT: 4 NG/L — SIGNIFICANT CHANGE UP
TROPONIN I, HIGH SENSITIVITY RESULT: <4 NG/L — SIGNIFICANT CHANGE UP
WBC # BLD: 15.14 K/UL — HIGH (ref 3.8–10.5)
WBC # FLD AUTO: 15.14 K/UL — HIGH (ref 3.8–10.5)

## 2024-07-04 PROCEDURE — 93010 ELECTROCARDIOGRAM REPORT: CPT | Mod: 77

## 2024-07-04 PROCEDURE — 74174 CTA ABD&PLVS W/CONTRAST: CPT | Mod: 26,MC

## 2024-07-04 PROCEDURE — 72128 CT CHEST SPINE W/O DYE: CPT | Mod: 26,MC

## 2024-07-04 PROCEDURE — 99223 1ST HOSP IP/OBS HIGH 75: CPT

## 2024-07-04 PROCEDURE — 93010 ELECTROCARDIOGRAM REPORT: CPT

## 2024-07-04 PROCEDURE — 99291 CRITICAL CARE FIRST HOUR: CPT

## 2024-07-04 PROCEDURE — 72125 CT NECK SPINE W/O DYE: CPT | Mod: 26,MC

## 2024-07-04 PROCEDURE — 71275 CT ANGIOGRAPHY CHEST: CPT | Mod: 26,MC

## 2024-07-04 RX ORDER — MAGNESIUM, ALUMINUM HYDROXIDE 400-400
30 TABLET,CHEWABLE ORAL EVERY 4 HOURS
Refills: 0 | Status: DISCONTINUED | OUTPATIENT
Start: 2024-07-04 | End: 2024-07-09

## 2024-07-04 RX ORDER — HYDROMORPHONE HCL 0.2 MG/ML
0.5 INJECTION, SOLUTION INTRAVENOUS ONCE
Refills: 0 | Status: DISCONTINUED | OUTPATIENT
Start: 2024-07-04 | End: 2024-07-04

## 2024-07-04 RX ORDER — DRONEDARONE 400 MG/1
400 TABLET, FILM COATED ORAL EVERY 12 HOURS
Refills: 0 | Status: DISCONTINUED | OUTPATIENT
Start: 2024-07-04 | End: 2024-07-09

## 2024-07-04 RX ORDER — ONDANSETRON HYDROCHLORIDE 2 MG/ML
4 INJECTION INTRAMUSCULAR; INTRAVENOUS ONCE
Refills: 0 | Status: COMPLETED | OUTPATIENT
Start: 2024-07-04 | End: 2024-07-04

## 2024-07-04 RX ORDER — SODIUM CHLORIDE 0.9 % (FLUSH) 0.9 %
500 SYRINGE (ML) INJECTION ONCE
Refills: 0 | Status: COMPLETED | OUTPATIENT
Start: 2024-07-04 | End: 2024-07-04

## 2024-07-04 RX ORDER — PANTOPRAZOLE SODIUM 40 MG/10ML
40 INJECTION, POWDER, FOR SOLUTION INTRAVENOUS
Refills: 0 | Status: DISCONTINUED | OUTPATIENT
Start: 2024-07-05 | End: 2024-07-09

## 2024-07-04 RX ORDER — GABAPENTIN
100 POWDER (GRAM) MISCELLANEOUS AT BEDTIME
Refills: 0 | Status: DISCONTINUED | OUTPATIENT
Start: 2024-07-04 | End: 2024-07-09

## 2024-07-04 RX ORDER — AMLODIPINE BESYLATE 2.5 MG/1
5 TABLET ORAL DAILY
Refills: 0 | Status: DISCONTINUED | OUTPATIENT
Start: 2024-07-05 | End: 2024-07-09

## 2024-07-04 RX ORDER — METOPROLOL TARTRATE 50 MG
50 TABLET ORAL DAILY
Refills: 0 | Status: DISCONTINUED | OUTPATIENT
Start: 2024-07-05 | End: 2024-07-09

## 2024-07-04 RX ORDER — DRONEDARONE 400 MG/1
1 TABLET, FILM COATED ORAL
Refills: 0 | DISCHARGE

## 2024-07-04 RX ORDER — MORPHINE SULFATE 100 MG/1
4 TABLET, EXTENDED RELEASE ORAL ONCE
Refills: 0 | Status: DISCONTINUED | OUTPATIENT
Start: 2024-07-04 | End: 2024-07-04

## 2024-07-04 RX ORDER — ACETAMINOPHEN 325 MG
975 TABLET ORAL EVERY 8 HOURS
Refills: 0 | Status: COMPLETED | OUTPATIENT
Start: 2024-07-04 | End: 2024-07-07

## 2024-07-04 RX ORDER — SODIUM CHLORIDE 0.9 % (FLUSH) 0.9 %
1000 SYRINGE (ML) INJECTION ONCE
Refills: 0 | Status: COMPLETED | OUTPATIENT
Start: 2024-07-04 | End: 2024-07-04

## 2024-07-04 RX ORDER — APIXABAN 5 MG/1
5 TABLET, FILM COATED ORAL EVERY 12 HOURS
Refills: 0 | Status: DISCONTINUED | OUTPATIENT
Start: 2024-07-04 | End: 2024-07-09

## 2024-07-04 RX ORDER — ATORVASTATIN CALCIUM 20 MG/1
40 TABLET, FILM COATED ORAL AT BEDTIME
Refills: 0 | Status: DISCONTINUED | OUTPATIENT
Start: 2024-07-04 | End: 2024-07-09

## 2024-07-04 RX ORDER — ONDANSETRON HYDROCHLORIDE 2 MG/ML
4 INJECTION INTRAMUSCULAR; INTRAVENOUS EVERY 8 HOURS
Refills: 0 | Status: DISCONTINUED | OUTPATIENT
Start: 2024-07-04 | End: 2024-07-09

## 2024-07-04 RX ORDER — LIDOCAINE HCL 28 MG/G
2 GEL TOPICAL DAILY
Refills: 0 | Status: DISCONTINUED | OUTPATIENT
Start: 2024-07-04 | End: 2024-07-09

## 2024-07-04 RX ADMIN — MORPHINE SULFATE 4 MILLIGRAM(S): 100 TABLET, EXTENDED RELEASE ORAL at 11:34

## 2024-07-04 RX ADMIN — MORPHINE SULFATE 4 MILLIGRAM(S): 100 TABLET, EXTENDED RELEASE ORAL at 11:21

## 2024-07-04 RX ADMIN — APIXABAN 5 MILLIGRAM(S): 5 TABLET, FILM COATED ORAL at 21:06

## 2024-07-04 RX ADMIN — MORPHINE SULFATE 4 MILLIGRAM(S): 100 TABLET, EXTENDED RELEASE ORAL at 11:36

## 2024-07-04 RX ADMIN — Medication 500 MILLILITER(S): at 11:51

## 2024-07-04 RX ADMIN — Medication 975 MILLIGRAM(S): at 21:06

## 2024-07-04 RX ADMIN — MORPHINE SULFATE 4 MILLIGRAM(S): 100 TABLET, EXTENDED RELEASE ORAL at 11:49

## 2024-07-04 RX ADMIN — Medication 100 MILLIGRAM(S): at 21:06

## 2024-07-04 RX ADMIN — HYDROMORPHONE HCL 0.5 MILLIGRAM(S): 0.2 INJECTION, SOLUTION INTRAVENOUS at 12:05

## 2024-07-04 RX ADMIN — HYDROMORPHONE HCL 0.5 MILLIGRAM(S): 0.2 INJECTION, SOLUTION INTRAVENOUS at 12:20

## 2024-07-04 RX ADMIN — Medication 975 MILLIGRAM(S): at 21:36

## 2024-07-04 RX ADMIN — LIDOCAINE HCL 2 PATCH: 28 GEL TOPICAL at 19:48

## 2024-07-04 RX ADMIN — LIDOCAINE HCL 2 PATCH: 28 GEL TOPICAL at 17:17

## 2024-07-04 RX ADMIN — ONDANSETRON HYDROCHLORIDE 4 MILLIGRAM(S): 2 INJECTION INTRAMUSCULAR; INTRAVENOUS at 11:34

## 2024-07-04 RX ADMIN — DRONEDARONE 400 MILLIGRAM(S): 400 TABLET, FILM COATED ORAL at 20:50

## 2024-07-04 RX ADMIN — ATORVASTATIN CALCIUM 40 MILLIGRAM(S): 20 TABLET, FILM COATED ORAL at 21:06

## 2024-07-04 RX ADMIN — Medication 1000 MILLILITER(S): at 11:21

## 2024-07-04 NOTE — H&P ADULT - ASSESSMENT
69 year old female with past medical history of CREST syndrome, GERD, HTN, HLD, A.fib, s/p loop recorder (2/2024), Left PCA CVA (11/2023), cervical/lumbar spine surgery several years ago, sciatica, who presents from home for 2 day history of upper back pain and throat pain.    Upper Back Pain   History of Cervical Spine Surgery  -CTA chest/abd/pelvis showed No aortic aneurysm or dissection. No pulmonary emboli. No acute   abnormality.  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 Moderate to severe left C6-7 foraminal stenosis. Moderate left T9-10 foraminal stenosis.  -Pain possible due to cervical spinal stenosis/muscle spasms, no red flags on exam   -Pain control: trial of tylenol ATC, lidocaine patches, gabapentin 100mg QHS - can uptitrate as needed if tolerates, can also try muscle relaxants if no improvement   -Fall precaution  -PT evaluation   -Outpatient orthopedic/neurosurgery evaluation     Leukocytosis   -May be reactive, she is afebrile, no focal complaints  -Monitor CBC, infectious workup if fever spike    Mild Hyponatremia  Mild CASSIDY  -Sodium level ranged 129-133 last admission  -Baseline Cr appears to be ~1.0 range  -Likely due to HCTz - will hold  -s/p IVF in ER  -Monitor BMP     Elevated BNP  -    HTN/HLD    A.fib  Status Post Loop Recorder    History of Left PCA CVA    Prediabetes  -HbA1C 5.8 in 11/2023  -Will repeat HbA1C  -Dietary and lifestyle modification     DVT PPx  -On Eliquis    Plan of care discussed with patient and  at bedside, they are in agreement, all questions are answered       69 year old female with past medical history of CREST syndrome, GERD, HTN, HLD, A.fib, s/p loop recorder (2/2024), Left PCA CVA (11/2023), cervical/lumbar spine surgery several years ago, sciatica, who presents from home for 2 day history of upper back pain and throat pain.    Upper Back Pain   History of Cervical Spine Surgery  -CTA chest/abd/pelvis showed No aortic aneurysm or dissection. No pulmonary emboli. No acute   abnormality.  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 Moderate to severe left C6-7 foraminal stenosis. Moderate left T9-10 foraminal stenosis.  -Pain possible due to cervical spinal stenosis/muscle spasms, no red flags on exam   -Pain control: trial of tylenol ATC, lidocaine patches, gabapentin 100mg QHS - can uptitrate as needed if tolerates, can also try muscle relaxants if no improvement   -Fall precaution  -PT evaluation   -Outpatient orthopedic/neurosurgery evaluation     ?Esophageal Pain  GERD  -In this patient with history of CREST syndrome, possible esophageal dysmotility  -Denies dysphagia/odynophagia  -CTA chest negative for PE, CT abd/pelvis unremarkable  -Continue PPI   -Consider GI eval if no improvement    Leukocytosis   -May be reactive, she is afebrile, no focal complaints  -Monitor CBC, infectious workup if fever spike    Mild Hyponatremia  Mild CASSIDY  -Sodium level ranged 129-133 last admission  -Baseline Cr appears to be ~1.0 range  -Likely due to HCTz + ARB - will hold  -s/p IVF in ER  -Monitor BMP     Elevated BNP  -Trop neg x 2, EKG showed A.flutter HR 90, non-specific T wave abn  -BNP 3094, no evidence of fluid overload on exam  -Echo 11/2023 showed EF 65 to 70%, no significant valvular disease  -Monitor on telemetry  -Repeat Echo ordered     HTN/HLD  -Will hold HCTz and ARB given CASSIDY as above  -Continue norvasc, metoprolol  -Monitor BP  -Continue atorvastatin   -Resume zetia on discharge     A.fib/A.flutter   Status Post Loop Recorder  -Trop neg x 2, EKG showed A.flutter HR 90, non-specific T wave abn  -Telemetry monitoring  -Continue multaq and metoprolol  -Continue Eliquis     History of Left PCA CVA  -Continue Eliquis and atorvastatin     Prediabetes  -HbA1C 5.8 in 11/2023  -Will repeat HbA1C  -Dietary and lifestyle modification     DVT PPx  -On Eliquis    Plan of care discussed with patient and  at bedside, they are in agreement, all questions are answered

## 2024-07-04 NOTE — ED PROVIDER NOTE - CRITICAL CARE ATTENDING CONTRIBUTION TO CARE
Upon my evaluation, this patient had a high probability of imminent or lifethreatening deterioration due to back pain rule out dissection, which required my direct attention, intervention, and personal management.     I have personally provided 31 minutes of critical care time exclusive of time spent on separately billable procedures. Time includes review of laboratory data, radiology results, discussion with consultants, and monitoring for potential decompensation. Interventions were performed as documented above.    - Stone Kowalski MD, Emergency Medicine and Medical Toxicology Attending.

## 2024-07-04 NOTE — ED ADULT NURSE NOTE - NSFALLHARMRISKINTERV_ED_ALL_ED

## 2024-07-04 NOTE — ED PROVIDER NOTE - EKG/XRAY ADDITIONAL INFORMATION
91 bpm, atrial fibrillation, QRS of 92 ms, QTc interval of 452 ms, there are no ST elevations or ST depressions, EKG is unchanged from her previous.

## 2024-07-04 NOTE — H&P ADULT - HISTORY OF PRESENT ILLNESS
69 year old female with past medical history of CREST syndrome, HTN, HLD, A.fib, s/p loop recorder (2/2024), Left PCA CVA (11/2023), cervical/lumbar spine surgery several years ago, sciatica, who presents from home for 2 day history of bilateral upper back pain and throat pain.  She reports being in her usual state of health until last 2 days when she developed 69 year old female with past medical history of CREST syndrome, HTN, HLD, A.fib, s/p loop recorder (2/2024), Left PCA CVA (11/2023), cervical/lumbar spine surgery several years ago, sciatica, who presents from home for 2 day history of bilateral upper back pain and throat pain.  She reports being in her usual state of health until last 2 days when she developed upper back pain radiating to her shoulders. She denies neck pain/radicular symptoms or weakness/numbness of her upper/lower extremities. Denies any strenuous activities or falls. No obvious alleviating or exacerbating factors.   She also endorses pain in her upper throat radiating down to her mid esophagus. Denies dysphagia or odynophagia. Pain is exacerbated with deep inspiration. No previous EGD's.  Denies sore throat/cough or URI symptoms.   She denies chest pain/SOB/LE edema or orthopnea. No fever/chills. Rest of ROS is negative.     In the ED, she was afebrile and hemodynamically stable.  Labs notable for WBC 15.1, INR 1.65, Na 132, BUN/Cr 40/1.35, trop neg x 2, BNP 3094  CT C/T spine showed well-seated components with solid ankylosis at C5-6. Moderate to severe left C6-7 foraminal stenosis and Moderate left T9-10 foraminal stenosis.  CTA Ches/Abd/Pelvis showed no aortic aneurysm or dissection. No pulmonary emboli. No acute abnormality.  She was given IVF, IV morphine and zofran.    She is being admitted for further management.  69 year old female with past medical history of CREST syndrome, GERD, HTN, HLD, A.fib, s/p loop recorder (2/2024), Left PCA CVA (11/2023), cervical/lumbar spine surgery several years ago, sciatica, who presents from home for 2 day history of upper back pain and throat pain.  She reports being in her usual state of health until last 2 days when she developed upper back pain radiating to her shoulders. She denies neck pain/radicular symptoms or weakness/numbness of her upper/lower extremities. Denies any strenuous activities or falls. No obvious alleviating or exacerbating factors.   She also endorses pain in her upper throat radiating down to her mid esophagus, unrelated to food. Denies dysphagia or odynophagia. Pain is exacerbated with deep inspiration. No N/V. No previous EGD's.  Denies sore throat/cough or URI symptoms.   She denies chest pain/SOB/LE edema or orthopnea. No fever/chills. Rest of ROS is negative.     In the ED, she was afebrile and hemodynamically stable.  Labs notable for WBC 15.1, INR 1.65, Na 132, BUN/Cr 40/1.35, trop neg x 2, BNP 3094  CT C/T spine showed well-seated components with solid ankylosis at C5-6. Moderate to severe left C6-7 foraminal stenosis and Moderate left T9-10 foraminal stenosis.  CTA Ches/Abd/Pelvis showed no aortic aneurysm or dissection. No pulmonary emboli. No acute abnormality.  She was given IVF, IV morphine and zofran.    She is being admitted for further management.

## 2024-07-04 NOTE — ED PROVIDER NOTE - CLINICAL SUMMARY MEDICAL DECISION MAKING FREE TEXT BOX
Dr. Stone Kowalski MD, EM And Medical Toxicology Attendin-year-old female with a past medical history of CREST syndrome, HTN, HLD, LEFT PCA infarct with residual confusion,  presenting with severe back pain since last night.  She was doing her normal routine when she started having moderate painful mid upper bilateral and midline thoracic back pain, that was atraumatic.  It was constant, radiating to the chest associated with shortness of breath.  Denies any tearing or ripping components.  Throughout the night she is gradually worsened.  On arrival to the emergency room her blood pressure was in the SBP 120s,   And she was still having severe upper back pain radiating to her chest associate with shortness of breath. Denies any  abdominal pain,  nausea/vomiting, headaches, fevers, chills,   weakness, syncope, hematuria, dysuria, urinary symptoms, subjective neurological deficits.  Of note her mother has a history of a AAA.   History obtained from independent historian:  at bedside  External note reviewed: previous admission here for left pca infarct  DDx includes but is not limited to:   Rule out ACS, rule out  aortic dissection,  pericardial effusion/cardiac tamponade, musculoskeletal back pain.  Decision making, ED course, independent interpretation of imaging studies, and consults:   -    obtain basic labs, troponin, BNP, type and screen and coags.  -   CT of the neck and thoracic spine, CTA of the chest abdomen pelvis for ruling out a dissection.  -   Pain control with morphine and Dilaudid as needed.      2:26 PM CTA of the chest abdomen pelvis is negative for a dissection, CT of the neck and thoracic spine showing spinal stenosis.  proBNP is elevated at 3000 on her previous echo last year in 2023, she had a normal ejection fraction and no pericardial effusion.  On CTA there is a small pericardial effusion and the patient although is hemodynamically stable show she does not show any clinical signs of tamponade.  Troponin x 2 are negative.  I will admit the patient for rule out ACS and possibly CHF or elevated proBNP, possibly due to a pulmonary hypertension versus small pericardial effusion?  The case was discussed with Dr. Bay hospitalist on-call who recommends admission and accepts the patient.      Consideration hospitalization vs de-escalation of care: XXX  Disposition: DC  ADMIT

## 2024-07-04 NOTE — ED PROVIDER NOTE - OBJECTIVE STATEMENT
69-year-old female with a past medical history of CREST syndrome, HTN, HLD, LEFT PCA infarct with residual confusion,  presenting with severe back pain since last night.  She was doing her normal routine when she started having moderate painful mid upper bilateral and midline thoracic back pain, that was atraumatic.  It was constant, radiating to the chest associated with shortness of breath.  Denies any tearing or ripping components.  Throughout the night she is gradually worsened.  On arrival to the emergency room her blood pressure was in the SBP 120s,   And she was still having severe upper back pain radiating to her chest associate with shortness of breath. Denies any  abdominal pain,  nausea/vomiting, headaches, fevers, chills,   weakness, syncope, hematuria, dysuria, urinary symptoms, subjective neurological deficits.  Of note her mother has a history of a AAA.

## 2024-07-04 NOTE — H&P ADULT - NSHPPHYSICALEXAM_GEN_ALL_CORE
T(C): 36.6 (07-04-24 @ 11:27), Max: 36.6 (07-04-24 @ 11:27)  HR: 89 (07-04-24 @ 11:27) (89 - 89)  BP: 102/81 (07-04-24 @ 11:27) (102/81 - 102/81)  RR: 20 (07-04-24 @ 11:27) (20 - 20)  SpO2: 95% (07-04-24 @ 11:27) (95% - 95%)  Wt(kg): --Vital Signs Last 24 Hrs  T(C): 36.6 (04 Jul 2024 11:27), Max: 36.6 (04 Jul 2024 11:27)  T(F): 97.9 (04 Jul 2024 11:27), Max: 97.9 (04 Jul 2024 11:27)  HR: 89 (04 Jul 2024 11:27) (89 - 89)  BP: 102/81 (04 Jul 2024 11:27) (102/81 - 102/81)  BP(mean): --  RR: 20 (04 Jul 2024 11:27) (20 - 20)  SpO2: 95% (04 Jul 2024 11:27) (95% - 95%)    Parameters below as of 04 Jul 2024 11:27  Patient On (Oxygen Delivery Method): room air    PHYSICAL EXAM:  GENERAL: NAD  HEENT: NCAT  NECK: Supple, No JVD  CHEST/LUNG: Clear to percussion bilaterally; No rales, rhonchi, wheezing  HEART: Regular rate and rhythm; No murmurs  ABDOMEN: Soft, Nontender, Nondistended; Bowel sounds present  MUSCULOSKELETAL/EXTREMITIES:  2+ Peripheral Pulses, No LE edema  No pain on palpation of cervical/thoracic/lumbar spine  Bilateral trapezius muscles are hypertonic   PSYCH: Appropriate affect  NEURO: AAO x 3, nonfocal, 5/5 UE and LE, sensation intact

## 2024-07-04 NOTE — ED ADULT NURSE NOTE - OBJECTIVE STATEMENT
Patient complaint of upper back and chest pain that started since yesterday. Patient states to have difficulty taking a deep breath. Denies any recent falls or trauma.

## 2024-07-04 NOTE — H&P ADULT - NSHPLABSRESULTS_GEN_ALL_CORE
CT C and T Spine  1.  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  2.  Moderate to severe left C6-7 foraminal stenosis.  3.  Moderate left T9-10 foraminal stenosis.    CTA Chest/Abd/Pelvis  IMPRESSION:  No aortic aneurysm or dissection. No pulmonary emboli. No acute   abnormality. LABS:                        13.7   15.14 )-----------( 188      ( 04 Jul 2024 11:10 )             41.1     07-04    132<L>  |  99  |  40<H>  ----------------------------<  146<H>  4.5   |  24  |  1.35<H>    Ca    9.7      04 Jul 2024 11:10  Mg     1.8     07-04    TPro  8.3  /  Alb  3.8  /  TBili  0.8  /  DBili  x   /  AST  28  /  ALT  24  /  AlkPhos  81  07-04    PT/INR - ( 04 Jul 2024 11:10 )   PT: 19.0 sec;   INR: 1.65 ratio         PTT - ( 04 Jul 2024 11:10 )  PTT:48.3 sec  Urinalysis Basic - ( 04 Jul 2024 11:10 )    Color: x / Appearance: x / SG: x / pH: x  Gluc: 146 mg/dL / Ketone: x  / Bili: x / Urobili: x   Blood: x / Protein: x / Nitrite: x   Leuk Esterase: x / RBC: x / WBC x   Sq Epi: x / Non Sq Epi: x / Bacteria: x    CAPILLARY BLOOD GLUCOSE    RADIOLOGY & ADDITIONAL TESTS:    CT C and T Spine  1.  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  2.  Moderate to severe left C6-7 foraminal stenosis.  3.  Moderate left T9-10 foraminal stenosis.    CTA Chest/Abd/Pelvis  IMPRESSION:  No aortic aneurysm or dissection. No pulmonary emboli. No acute   abnormality.

## 2024-07-04 NOTE — ED PROVIDER NOTE - PHYSICAL EXAMINATION
VITAL SIGNS: I have reviewed nursing notes and confirm.   GEN: Well-developed; well-nourished; in mild acute painful distress. Speaking full sentences.  SKIN: Warm, pink, no rash, no diaphoresis, no cyanosis, well perfused.   HEAD: Normocephalic; atraumatic. No scalp lacerations, no abrasions.  NECK: Supple; non tender.   EYES: Pupils 3mm equal, round, reactive to light and accomodation, conjunctiva and sclera clear. Extra-ocular movements intact bilaterally.  ENT: No nasal discharge; airway clear. Trachea is midline. Normal dentition.  CV: RRR. S1, S2 normal; no murmurs, gallops, or rubs. Capillary refill < 2 seconds throughout. Distal pulses intact 2+ throughout.  RESP: CTA bilaterally. No wheezes, rales, or rhonchi.   ABD: Normal bowel sounds, soft, non-distended, non-tender, no rebound, no guarding, no rigidity   MSK: Normal range of motion and movement of all 4 extremities. (+)  equal pulses bilaterally of the radial artery.  BACK: (+) minimal upper thoracic midline and parathoracic back pain; otherwise no other thoracolumbar midline or paravertebral tenderness. No step-offs or obvious deformities.  NEURO: Alert & oriented x 3, Grossly unremarkable. Sensory and motor intact throughout. No focal deficits. Normal speech and coordination.

## 2024-07-05 ENCOUNTER — TRANSCRIPTION ENCOUNTER (OUTPATIENT)
Age: 69
End: 2024-07-05

## 2024-07-05 ENCOUNTER — RESULT REVIEW (OUTPATIENT)
Age: 69
End: 2024-07-05

## 2024-07-05 LAB
A1C WITH ESTIMATED AVERAGE GLUCOSE RESULT: 6 % — HIGH (ref 4–5.6)
ALBUMIN SERPL ELPH-MCNC: 3.1 G/DL — LOW (ref 3.3–5)
ALP SERPL-CCNC: 101 U/L — SIGNIFICANT CHANGE UP (ref 40–120)
ALT FLD-CCNC: 40 U/L — SIGNIFICANT CHANGE UP (ref 10–45)
ANION GAP SERPL CALC-SCNC: 11 MMOL/L — SIGNIFICANT CHANGE UP (ref 5–17)
APPEARANCE UR: ABNORMAL
AST SERPL-CCNC: 37 U/L — SIGNIFICANT CHANGE UP (ref 10–40)
BACTERIA # UR AUTO: ABNORMAL /HPF
BILIRUB SERPL-MCNC: 0.7 MG/DL — SIGNIFICANT CHANGE UP (ref 0.2–1.2)
BILIRUB UR-MCNC: NEGATIVE — SIGNIFICANT CHANGE UP
BUN SERPL-MCNC: 56 MG/DL — HIGH (ref 7–23)
CALCIUM SERPL-MCNC: 9.3 MG/DL — SIGNIFICANT CHANGE UP (ref 8.4–10.5)
CHLORIDE SERPL-SCNC: 97 MMOL/L — SIGNIFICANT CHANGE UP (ref 96–108)
CO2 SERPL-SCNC: 23 MMOL/L — SIGNIFICANT CHANGE UP (ref 22–31)
COD CRY URNS QL: PRESENT
COLOR SPEC: SIGNIFICANT CHANGE UP
CREAT SERPL-MCNC: 2.51 MG/DL — HIGH (ref 0.5–1.3)
DIFF PNL FLD: NEGATIVE — SIGNIFICANT CHANGE UP
EGFR: 20 ML/MIN/1.73M2 — LOW
EPI CELLS # UR: 6 — SIGNIFICANT CHANGE UP
ESTIMATED AVERAGE GLUCOSE: 126 MG/DL — HIGH (ref 68–114)
GLUCOSE SERPL-MCNC: 149 MG/DL — HIGH (ref 70–99)
GLUCOSE UR QL: NEGATIVE MG/DL — SIGNIFICANT CHANGE UP
HCT VFR BLD CALC: 38.3 % — SIGNIFICANT CHANGE UP (ref 34.5–45)
HGB BLD-MCNC: 12.9 G/DL — SIGNIFICANT CHANGE UP (ref 11.5–15.5)
KETONES UR-MCNC: ABNORMAL MG/DL
LEUKOCYTE ESTERASE UR-ACNC: ABNORMAL
MCHC RBC-ENTMCNC: 33.4 PG — SIGNIFICANT CHANGE UP (ref 27–34)
MCHC RBC-ENTMCNC: 33.7 GM/DL — SIGNIFICANT CHANGE UP (ref 32–36)
MCV RBC AUTO: 99.2 FL — SIGNIFICANT CHANGE UP (ref 80–100)
NITRITE UR-MCNC: NEGATIVE — SIGNIFICANT CHANGE UP
NRBC # BLD: 0 /100 WBCS — SIGNIFICANT CHANGE UP (ref 0–0)
PH UR: 5 — SIGNIFICANT CHANGE UP (ref 5–8)
PLATELET # BLD AUTO: 161 K/UL — SIGNIFICANT CHANGE UP (ref 150–400)
POTASSIUM SERPL-MCNC: 4.9 MMOL/L — SIGNIFICANT CHANGE UP (ref 3.5–5.3)
POTASSIUM SERPL-SCNC: 4.9 MMOL/L — SIGNIFICANT CHANGE UP (ref 3.5–5.3)
PROT SERPL-MCNC: 7.6 G/DL — SIGNIFICANT CHANGE UP (ref 6–8.3)
PROT UR-MCNC: SIGNIFICANT CHANGE UP MG/DL
RAPID RVP RESULT: SIGNIFICANT CHANGE UP
RBC # BLD: 3.86 M/UL — SIGNIFICANT CHANGE UP (ref 3.8–5.2)
RBC # FLD: 13.9 % — SIGNIFICANT CHANGE UP (ref 10.3–14.5)
RBC CASTS # UR COMP ASSIST: 2 /HPF — SIGNIFICANT CHANGE UP (ref 0–4)
SARS-COV-2 RNA SPEC QL NAA+PROBE: SIGNIFICANT CHANGE UP
SODIUM SERPL-SCNC: 131 MMOL/L — LOW (ref 135–145)
SP GR SPEC: 1.04 — HIGH (ref 1–1.03)
UROBILINOGEN FLD QL: 1 MG/DL — SIGNIFICANT CHANGE UP (ref 0.2–1)
WBC # BLD: 17.39 K/UL — HIGH (ref 3.8–10.5)
WBC # FLD AUTO: 17.39 K/UL — HIGH (ref 3.8–10.5)
WBC UR QL: 4 /HPF — SIGNIFICANT CHANGE UP (ref 0–5)

## 2024-07-05 PROCEDURE — 99233 SBSQ HOSP IP/OBS HIGH 50: CPT

## 2024-07-05 PROCEDURE — 93306 TTE W/DOPPLER COMPLETE: CPT | Mod: 26

## 2024-07-05 RX ORDER — SODIUM CHLORIDE 0.9 % (FLUSH) 0.9 %
1000 SYRINGE (ML) INJECTION
Refills: 0 | Status: DISCONTINUED | OUTPATIENT
Start: 2024-07-05 | End: 2024-07-05

## 2024-07-05 RX ORDER — SODIUM CHLORIDE 0.9 % (FLUSH) 0.9 %
1000 SYRINGE (ML) INJECTION
Refills: 0 | Status: DISCONTINUED | OUTPATIENT
Start: 2024-07-05 | End: 2024-07-06

## 2024-07-05 RX ADMIN — Medication 100 MILLIGRAM(S): at 21:06

## 2024-07-05 RX ADMIN — PANTOPRAZOLE SODIUM 40 MILLIGRAM(S): 40 INJECTION, POWDER, FOR SOLUTION INTRAVENOUS at 05:05

## 2024-07-05 RX ADMIN — DRONEDARONE 400 MILLIGRAM(S): 400 TABLET, FILM COATED ORAL at 09:03

## 2024-07-05 RX ADMIN — Medication 975 MILLIGRAM(S): at 21:06

## 2024-07-05 RX ADMIN — Medication 50 MILLILITER(S): at 13:16

## 2024-07-05 RX ADMIN — Medication 975 MILLIGRAM(S): at 05:05

## 2024-07-05 RX ADMIN — Medication 975 MILLIGRAM(S): at 22:00

## 2024-07-05 RX ADMIN — Medication 975 MILLIGRAM(S): at 12:56

## 2024-07-05 RX ADMIN — APIXABAN 5 MILLIGRAM(S): 5 TABLET, FILM COATED ORAL at 21:06

## 2024-07-05 RX ADMIN — LIDOCAINE HCL 2 PATCH: 28 GEL TOPICAL at 05:15

## 2024-07-05 RX ADMIN — Medication 975 MILLIGRAM(S): at 13:53

## 2024-07-05 RX ADMIN — Medication 975 MILLIGRAM(S): at 05:35

## 2024-07-05 RX ADMIN — Medication 75 MILLILITER(S): at 20:01

## 2024-07-05 RX ADMIN — APIXABAN 5 MILLIGRAM(S): 5 TABLET, FILM COATED ORAL at 09:03

## 2024-07-05 RX ADMIN — ATORVASTATIN CALCIUM 40 MILLIGRAM(S): 20 TABLET, FILM COATED ORAL at 21:06

## 2024-07-05 RX ADMIN — DRONEDARONE 400 MILLIGRAM(S): 400 TABLET, FILM COATED ORAL at 21:06

## 2024-07-05 NOTE — DISCHARGE NOTE PROVIDER - CARE PROVIDER_API CALL
Kurzweil, Peter J.  Internal Medicine  [address] [place] [address]  Phone: [phone]  Fax: [phone]  Follow Up Time:

## 2024-07-05 NOTE — DISCHARGE NOTE PROVIDER - NSDCCPCAREPLAN_GEN_ALL_CORE_FT
PRINCIPAL DISCHARGE DIAGNOSIS  Diagnosis: Back pain  Assessment and Plan of Treatment: stable, outpatient follow up      SECONDARY DISCHARGE DIAGNOSES  Diagnosis: CASSIDY (acute kidney injury)  Assessment and Plan of Treatment: kidney function improved, please stop hydrochlorothiazide and candesartan, start bumex for diuresis. follow up with your PCP    Diagnosis: Hyponatremia  Assessment and Plan of Treatment: stable, see above

## 2024-07-05 NOTE — PROGRESS NOTE ADULT - SUBJECTIVE AND OBJECTIVE BOX
Patient is a 69y old  Female who presents with a chief complaint of Upper Back/Throat Pain (05 Jul 2024 07:41)      Patient seen and examined at bedside. No overnight events reported.     ALLERGIES:  No Known Allergies    MEDICATIONS  (STANDING):  acetaminophen     Tablet .. 975 milliGRAM(s) Oral every 8 hours  amLODIPine   Tablet 5 milliGRAM(s) Oral daily  apixaban 5 milliGRAM(s) Oral every 12 hours  atorvastatin 40 milliGRAM(s) Oral at bedtime  dronedarone 400 milliGRAM(s) Oral every 12 hours  gabapentin 100 milliGRAM(s) Oral at bedtime  lidocaine   4% Patch 2 Patch Transdermal daily  metoprolol succinate ER 50 milliGRAM(s) Oral daily  pantoprazole    Tablet 40 milliGRAM(s) Oral before breakfast    MEDICATIONS  (PRN):  aluminum hydroxide/magnesium hydroxide/simethicone Suspension 30 milliLiter(s) Oral every 4 hours PRN Dyspepsia  melatonin 3 milliGRAM(s) Oral at bedtime PRN Insomnia  ondansetron Injectable 4 milliGRAM(s) IV Push every 8 hours PRN Nausea and/or Vomiting    Vital Signs Last 24 Hrs  T(F): 98 (05 Jul 2024 05:00), Max: 98 (05 Jul 2024 05:00)  HR: 80 (05 Jul 2024 05:00) (75 - 89)  BP: 105/55 (05 Jul 2024 05:00) (98/65 - 105/55)  RR: 17 (05 Jul 2024 05:00) (15 - 20)  SpO2: 91% (05 Jul 2024 05:00) (91% - 96%)  I&O's Summary    PHYSICAL EXAM:  General: NAD, A/O x 3  ENT: No gross hearing impairment, Moist mucous membranes, no thrush  Neck: Supple, No JVD  Lungs: Clear to auscultation bilaterally, good air entry, non-labored breathing  Cardio: RRR, S1/S2, No murmur  Abdomen: Soft, Nontender, Nondistended; Bowel sounds present  Extremities: No calf tenderness, No cyanosis, No pitting edema, full sensation, equal 5/5 strength in all 4 extremities   Psych: Appropriate mood and affect    LABS:                        13.7   15.14 )-----------( 188      ( 04 Jul 2024 11:10 )             41.1     07-04    132  |  99  |  40  ----------------------------<  146  4.5   |  24  |  1.35    Ca    9.7      04 Jul 2024 11:10  Mg     1.8     07-04    TPro  8.3  /  Alb  3.8  /  TBili  0.8  /  DBili  x   /  AST  28  /  ALT  24  /  AlkPhos  81  07-04      Lipase: 32 U/L (07-04-24 @ 11:10)      PT/INR - ( 04 Jul 2024 11:10 )   PT: 19.0 sec;   INR: 1.65 ratio         PTT - ( 04 Jul 2024 11:10 )  PTT:48.3 sec      CARDIAC MARKERS ( 04 Jul 2024 12:40 )  x     / <4.0 ng/L / x     / x     / x      CARDIAC MARKERS ( 04 Jul 2024 11:10 )  x     / 4.0 ng/L / x     / x     / x                            Urinalysis Basic - ( 04 Jul 2024 11:10 )    Color: x / Appearance: x / SG: x / pH: x  Gluc: 146 mg/dL / Ketone: x  / Bili: x / Urobili: x   Blood: x / Protein: x / Nitrite: x   Leuk Esterase: x / RBC: x / WBC x   Sq Epi: x / Non Sq Epi: x / Bacteria: x          RADIOLOGY & ADDITIONAL TESTS:    Care Discussed with Consultants/Other Providers:    Patient is a 69y old  Female who presents with a chief complaint of Upper Back/Throat Pain (05 Jul 2024 07:41)      Patient seen and examined at bedside. No overnight events reported. Patient states she feels much better today     ALLERGIES:  No Known Allergies    MEDICATIONS  (STANDING):  acetaminophen     Tablet .. 975 milliGRAM(s) Oral every 8 hours  amLODIPine   Tablet 5 milliGRAM(s) Oral daily  apixaban 5 milliGRAM(s) Oral every 12 hours  atorvastatin 40 milliGRAM(s) Oral at bedtime  dronedarone 400 milliGRAM(s) Oral every 12 hours  gabapentin 100 milliGRAM(s) Oral at bedtime  lidocaine   4% Patch 2 Patch Transdermal daily  metoprolol succinate ER 50 milliGRAM(s) Oral daily  pantoprazole    Tablet 40 milliGRAM(s) Oral before breakfast    MEDICATIONS  (PRN):  aluminum hydroxide/magnesium hydroxide/simethicone Suspension 30 milliLiter(s) Oral every 4 hours PRN Dyspepsia  melatonin 3 milliGRAM(s) Oral at bedtime PRN Insomnia  ondansetron Injectable 4 milliGRAM(s) IV Push every 8 hours PRN Nausea and/or Vomiting    Vital Signs Last 24 Hrs  T(F): 98 (05 Jul 2024 05:00), Max: 98 (05 Jul 2024 05:00)  HR: 80 (05 Jul 2024 05:00) (75 - 89)  BP: 105/55 (05 Jul 2024 05:00) (98/65 - 105/55)  RR: 17 (05 Jul 2024 05:00) (15 - 20)  SpO2: 91% (05 Jul 2024 05:00) (91% - 96%)  I&O's Summary    PHYSICAL EXAM:  General: NAD, A/O x 3  ENT: No gross hearing impairment, Moist mucous membranes, no thrush  Neck: Supple, No JVD  Lungs: Clear to auscultation bilaterally, good air entry, non-labored breathing  Cardio: RRR, S1/S2, No murmur  Abdomen: Soft, Nontender, Nondistended; Bowel sounds present  Extremities: No calf tenderness, No cyanosis, No pitting edema, full sensation, equal 5/5 strength in all 4 extremities   Psych: Appropriate mood and affect    LABS:                        13.7   15.14 )-----------( 188      ( 04 Jul 2024 11:10 )             41.1     07-04    132  |  99  |  40  ----------------------------<  146  4.5   |  24  |  1.35    Ca    9.7      04 Jul 2024 11:10  Mg     1.8     07-04    TPro  8.3  /  Alb  3.8  /  TBili  0.8  /  DBili  x   /  AST  28  /  ALT  24  /  AlkPhos  81  07-04      Lipase: 32 U/L (07-04-24 @ 11:10)      PT/INR - ( 04 Jul 2024 11:10 )   PT: 19.0 sec;   INR: 1.65 ratio         PTT - ( 04 Jul 2024 11:10 )  PTT:48.3 sec      CARDIAC MARKERS ( 04 Jul 2024 12:40 )  x     / <4.0 ng/L / x     / x     / x      CARDIAC MARKERS ( 04 Jul 2024 11:10 )  x     / 4.0 ng/L / x     / x     / x                            Urinalysis Basic - ( 04 Jul 2024 11:10 )    Color: x / Appearance: x / SG: x / pH: x  Gluc: 146 mg/dL / Ketone: x  / Bili: x / Urobili: x   Blood: x / Protein: x / Nitrite: x   Leuk Esterase: x / RBC: x / WBC x   Sq Epi: x / Non Sq Epi: x / Bacteria: x          RADIOLOGY & ADDITIONAL TESTS:    Care Discussed with Consultants/Other Providers:

## 2024-07-05 NOTE — PROGRESS NOTE ADULT - ASSESSMENT
69 year old female with past medical history of CREST syndrome, GERD, HTN, HLD, A.fib, s/p loop recorder (2/2024), Left PCA CVA (11/2023), cervical/lumbar spine surgery several years ago, sciatica, who presents from home for 2 day history of upper back pain and throat pain.    #Upper Back Pain 2/2 cervical spinal stenosis/muscle spasms  #History of Cervical Spine Surgery  - CTA chest/abd/pelvis showed No aortic aneurysm or dissection. No pulmonary emboli. 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 Moderate to severe left C6-7 foraminal stenosis. Moderate left T9-10 foraminal stenosis.  - Pain control: trial of tylenol ATC, lidocaine patches, gabapentin 100mg QHS  - Fall precaution  - PT evaluation pending   - Outpatient orthopedic/neurosurgery evaluation     #Esophageal Pain  #GERD  - history of CREST syndrome, possible esophageal dysmotility  - Denies dysphagia/odynophagia  - CTA chest negative for PE, CT abd/pelvis unremarkable  - Continue PPI     #Leukocytosis   - May be reactive, afebrile, no focal complaints  - Monitor CBC, infectious workup if fever spike    #Mild Hyponatremia  #Mild CASSIDY  - Sodium level ranged 129-133 last admission  - Baseline Cr appears to be ~1.0 range  - Likely due to HCTZ + ARB - will hold  - encourage PO intake   - Monitor BMP     #Elevated BNP  - Trop neg x 2, EKG showed A.flutter HR 90, non-specific T wave abn  - BNP 3094, no evidence of fluid overload on exam  - Echo 11/2023 showed EF 65 to 70%, no significant valvular disease  - Monitor on telemetry  - Repeat Echo ordered     #HTN/HLD  - Will hold HCTz and ARB given CASSIDY as above  - Continue norvasc, metoprolol  - Monitor BP  - Continue atorvastatin   - Resume zetia on discharge     #A.fib/A.flutter   #Status Post Loop Recorder  - Trop neg x 2, EKG showed A.flutter HR 90, non-specific T wave abn  - Telemetry monitoring  - Continue multaq and metoprolol  - Continue Eliquis     #History of Left PCA CVA  - Continue Eliquis and atorvastatin     #Prediabetes  - HbA1C 5.8 in 11/2023  - Will repeat HbA1C  - Dietary and lifestyle modification     #DVT PPx  - On Eliquis    Dispo: pending improvement of clinicals     7/5 Plan of care discussed with patient and  at bedside, they are in agreement, all questions are answered       69 year old female with past medical history of CREST syndrome, GERD, HTN, HLD, A.fib, s/p loop recorder (2/2024), Left PCA CVA (11/2023), cervical/lumbar spine surgery several years ago, sciatica, who presents from home for 2 day history of upper back pain and throat pain.    #Upper Back Pain 2/2 cervical spinal stenosis/muscle spasms  #History of Cervical Spine Surgery  - CTA chest/abd/pelvis showed No aortic aneurysm or dissection. No pulmonary emboli. 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 Moderate to severe left C6-7 foraminal stenosis. Moderate left T9-10 foraminal stenosis.  - Pain control: trial of tylenol ATC, lidocaine patches, gabapentin 100mg QHS  - Fall precaution  - PT - no PT needs   - Outpatient orthopedic/neurosurgery evaluation     #Esophageal Pain  #GERD  - history of CREST syndrome, possible esophageal dysmotility  - Denies dysphagia/odynophagia  - CTA chest negative for PE, CT abd/pelvis unremarkable  - Continue PPI     #Leukocytosis   - May be reactive, afebrile, no focal complaints  - Monitor CBC, infectious workup if fever spike    #Mild Hyponatremia  #CASSIDY; Cr 1.3 on admit   - Sodium level ranged 129-133 last admission  - Baseline Cr appears to be ~1.0 range, Cr. 2.5   - added IVF   - Likely due to HCTZ + ARB - will hold  - encourage PO intake   - Monitor BMP     #Elevated BNP  - Trop neg x 2, EKG showed A.flutter HR 90, non-specific T wave abn  - BNP 3094, no evidence of fluid overload on exam  - Echo 11/2023 showed EF 65 to 70%, no significant valvular disease  - Monitor on telemetry  - echo - Left atrial enlargement. LVEF 55 to 60%. There is mild concentric left ventricular hypertrophy. Right heart enlargement Thickening of the anterior and posterior mitral valve leaflets. Trace mitral valve regurgitation.    #HTN/HLD  - Will hold HCTz and ARB given CASSIDY as above  - Continue norvasc, metoprolol  - Monitor BP  - Continue atorvastatin   - Resume zetia on discharge     #A.fib/A.flutter   #Status Post Loop Recorder  - Trop neg x 2, EKG showed A.flutter HR 90, non-specific T wave abn  - Telemetry monitoring  - Continue multaq and metoprolol  - Continue Eliquis     #History of Left PCA CVA  - Continue Eliquis and atorvastatin     #Prediabetes  - HbA1C 5.8 in 11/2023  - Will repeat HbA1C  - Dietary and lifestyle modification     #DVT PPx  - On Eliquis    Dispo: pending improvement of clinicals     7/5 Plan of care discussed with patient and  at bedside, they are in agreement, all questions are answered       69 year old female with past medical history of CREST syndrome, GERD, HTN, HLD, A.fib, s/p loop recorder (2/2024), Left PCA CVA (11/2023), cervical/lumbar spine surgery several years ago, sciatica, who presents from home for 2 day history of upper back pain and throat pain.    #Upper Back Pain 2/2 cervical spinal stenosis/muscle spasms  #History of Cervical Spine Surgery  - CTA chest/abd/pelvis showed No aortic aneurysm or dissection. No pulmonary emboli. 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 Moderate to severe left C6-7 foraminal stenosis. Moderate left T9-10 foraminal stenosis.  - Pain control: trial of tylenol ATC, lidocaine patches, gabapentin 100mg QHS  - Fall precaution  - PT - no PT needs   - Outpatient orthopedic/neurosurgery evaluation     #Esophageal Pain  #GERD  - history of CREST syndrome, possible esophageal dysmotility  - Denies dysphagia/odynophagia  - CTA chest negative for PE, CT abd/pelvis unremarkable  - Continue PPI     #Leukocytosis   - May be reactive, afebrile, no focal complaints  - Monitor CBC, infectious workup if fever spike  - UA ordered    #Mild Hyponatremia  #CASSIDY; Cr 1.3 on admit   - Sodium level ranged 129-133 last admission  - Baseline Cr appears to be ~1.0 range, Cr. 2.5   - added IVF   - Likely due to HCTZ + ARB - will hold  - urine lytes ordered   - encourage PO intake   - Monitor BMP     #Elevated BNP  - Trop neg x 2, EKG showed A.flutter HR 90, non-specific T wave abn  - BNP 3094, no evidence of fluid overload on exam  - Echo 11/2023 showed EF 65 to 70%, no significant valvular disease  - Monitor on telemetry  - echo - Left atrial enlargement. LVEF 55 to 60%. There is mild concentric left ventricular hypertrophy. Right heart enlargement Thickening of the anterior and posterior mitral valve leaflets. Trace mitral valve regurgitation.    #HTN/HLD  - Will hold HCTz and ARB given CASSIDY as above  - Continue norvasc, metoprolol  - Monitor BP  - Continue atorvastatin   - Resume zetia on discharge     #A.fib/A.flutter   #Status Post Loop Recorder  - Trop neg x 2, EKG showed A.flutter HR 90, non-specific T wave abn  - Telemetry monitoring  - Continue multaq and metoprolol  - Continue Eliquis     #History of Left PCA CVA  - Continue Eliquis and atorvastatin     #Prediabetes  - HbA1C 5.8 in 11/2023  - Will repeat HbA1C  - Dietary and lifestyle modification     #DVT PPx  - On Eliquis    Dispo: pending improvement of clinicals     7/5 Plan of care discussed with patient and  at bedside, they are in agreement, all questions are answered

## 2024-07-05 NOTE — PHYSICAL THERAPY INITIAL EVALUATION ADULT - PERTINENT HX OF CURRENT PROBLEM, REHAB EVAL
69 year old female with past medical history of CREST syndrome, GERD, HTN, HLD, A.fib, s/p loop recorder (2/2024), Left PCA CVA (11/2023), cervical/lumbar spine surgery several years ago, sciatica, who presents from home for 2 day history of upper back pain and throat pain.  She reports being in her usual state of health until last 2 days when she developed upper back pain radiating to her shoulders. She denies neck pain/radicular symptoms or weakness/numbness of her upper/lower extremities. Denies any strenuous activities or falls. No obvious alleviating or exacerbating factors.   She also endorses pain in her upper throat radiating down to her mid esophagus, unrelated to food. Denies dysphagia or odynophagia. Pain is exacerbated with deep inspiration. No N/V. No previous EGD's.

## 2024-07-05 NOTE — DISCHARGE NOTE PROVIDER - HOSPITAL COURSE
Hospital Course  HPI:  69 year old female with past medical history of CREST syndrome, GERD, HTN, HLD, A.fib, s/p loop recorder (2/2024), Left PCA CVA (11/2023), cervical/lumbar spine surgery several years ago, sciatica, who presents from home for 2 day history of upper back pain and throat pain. She reports being in her usual state of health until last 2 days when she developed upper back pain radiating to her shoulders. She denies neck pain/radicular symptoms or weakness/numbness of her upper/lower extremities. She also endorses pain in her upper throat radiating down to her mid esophagus, unrelated to food. Patient had CTA chest/abd/pelvis which showed no aortic aneurysm/dissection, no PE, no acute abnormality. CT c/t spine showed well-seated components with solid ankylosis at C5-6. Moderate to severe left C6-7 foraminal stenosis. Moderate left T9-10 foraminal stenosis. Patient was treated with pain management and seen by PT. PT determined no PT needs. Recommend outpatient orthopedic/neurosurgery evaluation. Patient has known CREST syndrome, possible esophageal dysmotility. PPI was continued in house. Patient had elevated BNP on admission, troponin was negative x2. EKG showed a flutter, rate controlled. Patient had repeat echo which showed - Left atrial enlargement. LVEF 55 to 60%. There is mild concentric left ventricular hypertrophy. Right heart enlargement Thickening of the anterior and posterior mitral valve leaflets. Trace mitral valve regurgitation. Patient has loop recorder. Patient had CASSIDY during stay, was given IVF and HCTZ/ARB home meds held...         You will need to follow up with your primary care physician.      Discharging Provider:   Contact Info: Cell           - Please call with any questions or concerns.    Outpatient Provider: Dr. Kurzweil        Hospital Course  HPI:  69 year old female with past medical history of CREST syndrome, GERD, HTN, HLD, A.fib, s/p loop recorder (2/2024), Left PCA CVA (11/2023), cervical/lumbar spine surgery several years ago, sciatica, who presents from home for 2 day history of upper back pain and throat pain. She reports being in her usual state of health until last 2 days when she developed upper back pain radiating to her shoulders. She denies neck pain/radicular symptoms or weakness/numbness of her upper/lower extremities. She also endorses pain in her upper throat radiating down to her mid esophagus, unrelated to food. Patient had CTA chest/abd/pelvis which showed no aortic aneurysm/dissection, no PE, no acute abnormality. CT c/t spine showed well-seated components with solid ankylosis at C5-6. Moderate to severe left C6-7 foraminal stenosis. Moderate left T9-10 foraminal stenosis. Patient was treated with pain management and seen by PT. PT determined no PT needs. Recommend outpatient orthopedic/neurosurgery evaluation. Patient has known CREST syndrome, possible esophageal dysmotility. PPI was continued in house. Patient had elevated BNP on admission, troponin was negative x2. EKG showed a flutter, rate controlled. Patient had repeat echo which showed - Left atrial enlargement. LVEF 55 to 60%. There is mild concentric left ventricular hypertrophy. Right heart enlargement Thickening of the anterior and posterior mitral valve leaflets. Trace mitral valve regurgitation. Patient has loop recorder. Patient had CASSIDY during stay, was given IVF and HCTZ/ARB home meds held.  Renal followed, recommend starting bumex and stopping HCTZ, stopped ARB, fluid restrict.  Stable for outpatient follow up with PCP.        You will need to follow up with your primary care physician.      Discharging Provider: Edwin Mahajan NP  Contact Info: Cell  [phone]   - Please call with any questions or concerns.    Outpatient Provider: Dr. Kurzweil        Hospital Course  HPI:  69 year old female with past medical history of CREST syndrome, GERD, HTN, HLD, A.fib, s/p loop recorder (2/2024), Left PCA CVA (11/2023), cervical/lumbar spine surgery several years ago, sciatica, who presents from home for 2 day history of upper back pain and throat pain. She reports being in her usual state of health until last 2 days when she developed upper back pain radiating to her shoulders. She denies neck pain/radicular symptoms or weakness/numbness of her upper/lower extremities. She also endorses pain in her upper throat radiating down to her mid esophagus, unrelated to food. Patient had CTA chest/abd/pelvis which showed no aortic aneurysm/dissection, no PE, no acute abnormality. CT c/t spine showed well-seated components with solid ankylosis at C5-6. Moderate to severe left C6-7 foraminal stenosis. Moderate left T9-10 foraminal stenosis. Patient was treated with pain management and seen by PT. PT determined no PT needs. Recommend outpatient orthopedic/neurosurgery evaluation. Patient has known CREST syndrome, possible esophageal dysmotility. PPI was continued in house. Patient had elevated BNP on admission, troponin was negative x2. EKG showed a flutter, rate controlled. Patient had repeat echo which showed - Left atrial enlargement. LVEF 55 to 60%. There is mild concentric left ventricular hypertrophy. Right heart enlargement Thickening of the anterior and posterior mitral valve leaflets. Trace mitral valve regurgitation. Patient has loop recorder. Patient improved with steroids and duonebs for acute asthma exacerbation. Patient had CASSIDY during stay, was given IVF and HCTZ/ARB home meds held.  Renal followed, recommend starting bumex and stopping HCTZ, stopped ARB, fluid restrict.  Stable for outpatient follow up with PCP.    You will need to follow up with your primary care physician.    Discharging Provider: Edwin Mahajan NP  Contact Info: Cell  [phone]   - Please call with any questions or concerns.    Outpatient Provider: Dr. Kurzweil     Time Spent: 45 minutes

## 2024-07-05 NOTE — DISCHARGE NOTE PROVIDER - NSDCMRMEDTOKEN_GEN_ALL_CORE_FT
apixaban 5 mg oral tablet: 1 tab(s) orally every 12 hours  atorvastatin 40 mg oral tablet: 1 tab(s) orally once a day (at bedtime)  candesartan 16 mg oral tablet: 1 tab(s) orally once a day  hydroCHLOROthiazide 25 mg oral tablet: 1 tab(s) orally once a day  metoprolol succinate 50 mg oral tablet, extended release: 1 tab(s) orally once a day  Multaq 400 mg oral tablet: 1 tab(s) orally 2 times a day  Norvasc 5 mg oral tablet: 1 tab(s) orally once a day  omeprazole 40 mg oral delayed release capsule: 1 cap(s) orally once a day  Zetia 10 mg oral tablet: 1 tab(s) orally once a day   apixaban 5 mg oral tablet: 1 tab(s) orally every 12 hours  atorvastatin 40 mg oral tablet: 1 tab(s) orally once a day (at bedtime)  bumetanide 1 mg oral tablet: 1 tab(s) orally once a day  metoprolol succinate 50 mg oral tablet, extended release: 1 tab(s) orally once a day  Multaq 400 mg oral tablet: 1 tab(s) orally 2 times a day  Neurontin 100 mg oral capsule: 1 cap(s) orally once a day  Norvasc 5 mg oral tablet: 1 tab(s) orally once a day  omeprazole 40 mg oral delayed release capsule: 1 cap(s) orally once a day  predniSONE 20 mg oral tablet: 2 tab(s) orally once a day  Zetia 10 mg oral tablet: 1 tab(s) orally once a day

## 2024-07-05 NOTE — PHYSICAL THERAPY INITIAL EVALUATION ADULT - ADDITIONAL COMMENTS
pt lives w/spouse in private house, long w/rail,  1 flt to br. pt is independent w/ambulation and ADL's, +, no DME

## 2024-07-06 LAB
ANION GAP SERPL CALC-SCNC: 12 MMOL/L — SIGNIFICANT CHANGE UP (ref 5–17)
BASOPHILS # BLD AUTO: 0.02 K/UL — SIGNIFICANT CHANGE UP (ref 0–0.2)
BASOPHILS NFR BLD AUTO: 0.2 % — SIGNIFICANT CHANGE UP (ref 0–2)
BUN SERPL-MCNC: 66 MG/DL — HIGH (ref 7–23)
CALCIUM SERPL-MCNC: 8.7 MG/DL — SIGNIFICANT CHANGE UP (ref 8.4–10.5)
CHLORIDE SERPL-SCNC: 96 MMOL/L — SIGNIFICANT CHANGE UP (ref 96–108)
CO2 SERPL-SCNC: 20 MMOL/L — LOW (ref 22–31)
CREAT ?TM UR-MCNC: 152 MG/DL — SIGNIFICANT CHANGE UP
CREAT SERPL-MCNC: 2.54 MG/DL — HIGH (ref 0.5–1.3)
EGFR: 20 ML/MIN/1.73M2 — LOW
EOSINOPHIL # BLD AUTO: 0.2 K/UL — SIGNIFICANT CHANGE UP (ref 0–0.5)
EOSINOPHIL NFR BLD AUTO: 1.9 % — SIGNIFICANT CHANGE UP (ref 0–6)
GLUCOSE SERPL-MCNC: 87 MG/DL — SIGNIFICANT CHANGE UP (ref 70–99)
HCT VFR BLD CALC: 32.8 % — LOW (ref 34.5–45)
HGB BLD-MCNC: 11.5 G/DL — SIGNIFICANT CHANGE UP (ref 11.5–15.5)
IMM GRANULOCYTES NFR BLD AUTO: 0.4 % — SIGNIFICANT CHANGE UP (ref 0–0.9)
LYMPHOCYTES # BLD AUTO: 0.75 K/UL — LOW (ref 1–3.3)
LYMPHOCYTES # BLD AUTO: 7 % — LOW (ref 13–44)
MCHC RBC-ENTMCNC: 33.9 PG — SIGNIFICANT CHANGE UP (ref 27–34)
MCHC RBC-ENTMCNC: 35.1 GM/DL — SIGNIFICANT CHANGE UP (ref 32–36)
MCV RBC AUTO: 96.8 FL — SIGNIFICANT CHANGE UP (ref 80–100)
MONOCYTES # BLD AUTO: 1.45 K/UL — HIGH (ref 0–0.9)
MONOCYTES NFR BLD AUTO: 13.5 % — SIGNIFICANT CHANGE UP (ref 2–14)
NEUTROPHILS # BLD AUTO: 8.31 K/UL — HIGH (ref 1.8–7.4)
NEUTROPHILS NFR BLD AUTO: 77 % — SIGNIFICANT CHANGE UP (ref 43–77)
NRBC # BLD: 0 /100 WBCS — SIGNIFICANT CHANGE UP (ref 0–0)
OSMOLALITY UR: 351 MOSM/KG — SIGNIFICANT CHANGE UP (ref 50–1200)
PLATELET # BLD AUTO: 152 K/UL — SIGNIFICANT CHANGE UP (ref 150–400)
POTASSIUM SERPL-MCNC: 4.2 MMOL/L — SIGNIFICANT CHANGE UP (ref 3.5–5.3)
POTASSIUM SERPL-SCNC: 4.2 MMOL/L — SIGNIFICANT CHANGE UP (ref 3.5–5.3)
POTASSIUM UR-SCNC: 63.7 MMOL/L — SIGNIFICANT CHANGE UP
PROT ?TM UR-MCNC: 39 MG/DL — HIGH (ref 0–12)
PROT/CREAT UR-RTO: 0.3 RATIO — HIGH (ref 0–0.2)
RBC # BLD: 3.39 M/UL — LOW (ref 3.8–5.2)
RBC # FLD: 13.9 % — SIGNIFICANT CHANGE UP (ref 10.3–14.5)
SODIUM SERPL-SCNC: 128 MMOL/L — LOW (ref 135–145)
SODIUM UR-SCNC: <20 MMOL/L — SIGNIFICANT CHANGE UP
UUN UR-MCNC: 219 MG/DL — SIGNIFICANT CHANGE UP
WBC # BLD: 10.77 K/UL — HIGH (ref 3.8–10.5)
WBC # FLD AUTO: 10.77 K/UL — HIGH (ref 3.8–10.5)

## 2024-07-06 PROCEDURE — 99233 SBSQ HOSP IP/OBS HIGH 50: CPT

## 2024-07-06 PROCEDURE — 76775 US EXAM ABDO BACK WALL LIM: CPT | Mod: 26

## 2024-07-06 RX ORDER — IPRATROPIUM BROMIDE AND ALBUTEROL SULFATE .5; 3 MG/3ML; MG/3ML
3 SOLUTION RESPIRATORY (INHALATION) ONCE
Refills: 0 | Status: COMPLETED | OUTPATIENT
Start: 2024-07-06 | End: 2024-07-06

## 2024-07-06 RX ADMIN — Medication 975 MILLIGRAM(S): at 15:01

## 2024-07-06 RX ADMIN — Medication 975 MILLIGRAM(S): at 21:16

## 2024-07-06 RX ADMIN — Medication 975 MILLIGRAM(S): at 05:07

## 2024-07-06 RX ADMIN — Medication 975 MILLIGRAM(S): at 22:00

## 2024-07-06 RX ADMIN — Medication 50 MILLIGRAM(S): at 05:07

## 2024-07-06 RX ADMIN — DRONEDARONE 400 MILLIGRAM(S): 400 TABLET, FILM COATED ORAL at 21:18

## 2024-07-06 RX ADMIN — APIXABAN 5 MILLIGRAM(S): 5 TABLET, FILM COATED ORAL at 21:18

## 2024-07-06 RX ADMIN — IPRATROPIUM BROMIDE AND ALBUTEROL SULFATE 3 MILLILITER(S): .5; 3 SOLUTION RESPIRATORY (INHALATION) at 15:36

## 2024-07-06 RX ADMIN — AMLODIPINE BESYLATE 5 MILLIGRAM(S): 2.5 TABLET ORAL at 05:07

## 2024-07-06 RX ADMIN — APIXABAN 5 MILLIGRAM(S): 5 TABLET, FILM COATED ORAL at 10:34

## 2024-07-06 RX ADMIN — DRONEDARONE 400 MILLIGRAM(S): 400 TABLET, FILM COATED ORAL at 09:51

## 2024-07-06 RX ADMIN — ATORVASTATIN CALCIUM 40 MILLIGRAM(S): 20 TABLET, FILM COATED ORAL at 21:17

## 2024-07-06 RX ADMIN — Medication 975 MILLIGRAM(S): at 06:05

## 2024-07-06 RX ADMIN — Medication 75 MILLILITER(S): at 01:35

## 2024-07-06 RX ADMIN — PANTOPRAZOLE SODIUM 40 MILLIGRAM(S): 40 INJECTION, POWDER, FOR SOLUTION INTRAVENOUS at 05:07

## 2024-07-06 RX ADMIN — Medication 975 MILLIGRAM(S): at 16:01

## 2024-07-06 NOTE — PROGRESS NOTE ADULT - SUBJECTIVE AND OBJECTIVE BOX
Patient is a 69y old  Female who presents with a chief complaint of Upper Back/Throat Pain (05 Jul 2024 15:18)    Patient seen and examined at bedside.  no acute overnight events    ALLERGIES:  No Known Allergies        Vital Signs Last 24 Hrs  T(F): 97.9 (06 Jul 2024 05:00), Max: 98.2 (05 Jul 2024 11:10)  HR: 72 (06 Jul 2024 05:00) (72 - 84)  BP: 124/70 (06 Jul 2024 05:00) (87/53 - 124/70)  RR: 13 (06 Jul 2024 05:00) (13 - 27)  SpO2: 94% (06 Jul 2024 05:00) (90% - 94%)  I&O's Summary    05 Jul 2024 07:01  -  06 Jul 2024 07:00  --------------------------------------------------------  IN: 200 mL / OUT: 0 mL / NET: 200 mL      MEDICATIONS:  acetaminophen     Tablet .. 975 milliGRAM(s) Oral every 8 hours  aluminum hydroxide/magnesium hydroxide/simethicone Suspension 30 milliLiter(s) Oral every 4 hours PRN  amLODIPine   Tablet 5 milliGRAM(s) Oral daily  apixaban 5 milliGRAM(s) Oral every 12 hours  atorvastatin 40 milliGRAM(s) Oral at bedtime  dronedarone 400 milliGRAM(s) Oral every 12 hours  gabapentin 100 milliGRAM(s) Oral at bedtime  lidocaine   4% Patch 2 Patch Transdermal daily  melatonin 3 milliGRAM(s) Oral at bedtime PRN  metoprolol succinate ER 50 milliGRAM(s) Oral daily  ondansetron Injectable 4 milliGRAM(s) IV Push every 8 hours PRN  pantoprazole    Tablet 40 milliGRAM(s) Oral before breakfast  sodium chloride 0.9%. 1000 milliLiter(s) IV Continuous <Continuous>      PHYSICAL EXAM:  General: NAD, A/O x 3  ENT: MMM, no thrush  Neck: Supple, No JVD  Lungs: Clear to auscultation bilaterally, non labored, good air entry  Cardio: RRR, S1/S2, No murmurs  Abdomen: Soft, Nontender, Nondistended; Bowel sounds present  Extremities: No cyanosis, No edema    LABS:                        11.5   10.77 )-----------( 152      ( 06 Jul 2024 06:57 )             32.8     07-06    128  |  96  |  66  ----------------------------<  87  4.2   |  20  |  2.54    Ca    8.7      06 Jul 2024 06:57  Mg     1.8     07-04    TPro  7.6  /  Alb  3.1  /  TBili  0.7  /  DBili  x   /  AST  37  /  ALT  40  /  AlkPhos  101  07-05      PT/INR - ( 04 Jul 2024 11:10 )   PT: 19.0 sec;   INR: 1.65 ratio         PTT - ( 04 Jul 2024 11:10 )  PTT:48.3 sec    CARDIAC MARKERS ( 04 Jul 2024 12:40 )  x     / <4.0 ng/L / x     / x     / x      CARDIAC MARKERS ( 04 Jul 2024 11:10 )  x     / 4.0 ng/L / x     / x     / x                            Urinalysis Basic - ( 06 Jul 2024 06:57 )    Color: x / Appearance: x / SG: x / pH: x  Gluc: 87 mg/dL / Ketone: x  / Bili: x / Urobili: x   Blood: x / Protein: x / Nitrite: x   Leuk Esterase: x / RBC: x / WBC x   Sq Epi: x / Non Sq Epi: x / Bacteria: x            RADIOLOGY & ADDITIONAL TESTS:    Care Discussed with Consultants/Other Providers:

## 2024-07-06 NOTE — PROGRESS NOTE ADULT - ASSESSMENT
69 year old female with past medical history of CREST syndrome, GERD, HTN, HLD, A.fib, s/p loop recorder (2/2024), Left PCA CVA (11/2023), cervical/lumbar spine surgery several years ago, sciatica, who presents from home for 2 day history of upper back pain and throat pain.    #Upper Back Pain 2/2 cervical spinal stenosis/muscle spasms  #History of Cervical Spine Surgery  - CTA chest/abd/pelvis showed No aortic aneurysm or dissection. No pulmonary emboli. 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 Moderate to severe left C6-7 foraminal stenosis. Moderate left T9-10 foraminal stenosis.  - Pain control: trial of tylenol ATC, lidocaine patches, gabapentin 100mg QHS  - Fall precaution  - PT - no PT needs   - Outpatient orthopedic/neurosurgery evaluation     #Esophageal Pain  #GERD  - history of CREST syndrome, possible esophageal dysmotility  - Denies dysphagia/odynophagia  - CTA chest negative for PE, CT abd/pelvis unremarkable  - Continue PPI     #Leukocytosis   - May be reactive, afebrile, no focal complaints  - WBC much improved, no infectious process apparent    #Mild Hyponatremia  #CASSIDY; Cr 1.3 on admit   - Sodium level ranged 129-133 last admission  - Baseline Cr appears to be ~1.0 range  - Will stop IVF , follow up renal Son[facility], will get renal consult  - Likely due to HCTZ + ARB - will hold  - encourage PO intake   - Monitor BMP, Avoid nephrotoxins     #Elevated BNP  - Trop neg x 2, EKG showed A.flutter HR 90, non-specific T wave abn  - BNP 3094, no evidence of fluid overload on exam  - Echo 11/2023 showed EF 65 to 70%, no significant valvular disease  - Monitor on telemetry  - echo - Left atrial enlargement. LVEF 55 to 60%. There is mild concentric left ventricular hypertrophy. Right heart enlargement Thickening of the anterior and posterior mitral valve leaflets. Trace mitral valve regurgitation.    #HTN/HLD  - Will hold HCTz and ARB given CASSIDY as above  - Continue norvasc, metoprolol  - Monitor BP  - Continue atorvastatin   - Resume zetia on discharge     #A.fib/A.flutter   #Status Post Loop Recorder  - Trop neg x 2, EKG showed A.flutter HR 90, non-specific T wave abn  - Telemetry monitoring  - Continue multaq and metoprolol  - Continue Eliquis     #History of Left PCA CVA  - Continue Eliquis and atorvastatin     #Prediabetes  - HbA1C 5.8 in 11/2023  - Will repeat HbA1C  - Dietary and lifestyle modification     #DVT PPx  - On Eliquis    Dispo: pending improvement of clinicals     Plan of care discussed with patient and  at bedside, they are in agreement, all questions are answered

## 2024-07-06 NOTE — CONSULT NOTE ADULT - SUBJECTIVE AND OBJECTIVE BOX
HPI:  Patient is a 69y old  Female with no known to her hx of CKD, Cr 1.20 in , h/o CREST syndrome, pleural effusion who presented to the ED few days ago c/o chest pain radiation to bl shoulders and across back.   Her admission Cr was 1.35 on . She had CTAngio of C/A/P with 90cc of IV Omnipaque. Cr jumped sharply within 24h of IV dye use. She received IVFs but developed acute dyspnea and currently requires NRBM.   Repeat Cr this morning reflects little change since yest possibly signifying early plateau.         PAST MEDICAL & SURGICAL HISTORY:  HTN (hypertension)  HLD (hyperlipidemia)  Calcinosis, Raynaud's phenomenon, sclerodactyly, and telangiectasia (CREST) syndrome  HNP (herniated nucleus pulposus), lumbar  History of pleural effusion  right  ; resolved  Leg swelling  Heart murmur  H/O  section    S/P cervical discectomy              FAMILY HISTORY:  Family history of diabetes mellitus in sister  FHx: heart disease  parents  Family history of abdominal aortic aneurysm (AAA)  mother        Allergies    No Known Allergies    Intolerances        MEDICATIONS  (STANDING):  acetaminophen     Tablet .. 975 milliGRAM(s) Oral every 8 hours  amLODIPine   Tablet 5 milliGRAM(s) Oral daily  apixaban 5 milliGRAM(s) Oral every 12 hours  atorvastatin 40 milliGRAM(s) Oral at bedtime  dronedarone 400 milliGRAM(s) Oral every 12 hours  gabapentin 100 milliGRAM(s) Oral at bedtime  lidocaine   4% Patch 2 Patch Transdermal daily  metoprolol succinate ER 50 milliGRAM(s) Oral daily  pantoprazole    Tablet 40 milliGRAM(s) Oral before breakfast  sodium chloride 0.9%. 1000 milliLiter(s) (75 mL/Hr) IV Continuous <Continuous>    MEDICATIONS  (PRN):  aluminum hydroxide/magnesium hydroxide/simethicone Suspension 30 milliLiter(s) Oral every 4 hours PRN Dyspepsia  melatonin 3 milliGRAM(s) Oral at bedtime PRN Insomnia  ondansetron Injectable 4 milliGRAM(s) IV Push every 8 hours PRN Nausea and/or Vomiting      Daily     Daily     Drug Dosing Weight  Height (cm): 165.1 (2024 11:27)  Weight (kg): 98.9 (2024 11:27)  BMI (kg/m2): 36.3 (2024 11:27)  BSA (m2): 2.05 (2024 11:27)      REVIEW OF SYSTEMS:    CONSTITUTIONAL: No fever, weight loss, or fatigue  ENMT:  No difficulty hearing, tinnitus, vertigo. No sinus or throat pain  NECK: No pain or stiffness  RESPIRATORY: No cough, wheezing, chills or hemoptysis. No shortness of breath  CARDIOVASCULAR: No chest pain, palpitations, dizziness, or leg swelling  GASTROINTESTINAL: No abdominal pain. No nausea, vomiting or hematemesis. No diarrhea or constipation. No melena or hematochezia.  GENITOURINARY: No dysuria, frequency, hematuria, or incontinence  SKIN: No itching, burning, rashes, or lesions   LYMPH NODES: No enlarged glands  NEURO: no asterixis            I&O's Detail    2024 07:01  -  2024 07:00  --------------------------------------------------------  IN:    sodium chloride 0.9%: 50 mL    sodium chloride 0.9%: 150 mL  Total IN: 200 mL    OUT:  Total OUT: 0 mL    Total NET: 200 mL          07-05 @ 07:01  -  07-06 @ 07:00  --------------------------------------------------------  IN: 200 mL / OUT: 0 mL / NET: 200 mL        PHYSICAL EXAM:    GENERAL: anxious, dyspneic, on NRBM  HEAD:  Atraumatic, normocephalic  NECK: Supple. No increase in JVP  NERVOUS SYSTEM:  Alert & Oriented X3. Motor Strength 5/5 B/L upper and lower extremities; DTRs 2+ intact and symmetric  CHEST/LUNG: crackles  HEART: Regular rate and rhythm. No murmurs, rubs, or gallops  ABDOMEN: Soft, Nontender, Nondistended. POS BS  EXTREMITIES:  pos edema.     LABS:  CBC Full  -  ( 2024 06:57 )  WBC Count : 10.77 K/uL  RBC Count : 3.39 M/uL  Hemoglobin : 11.5 g/dL  Hematocrit : 32.8 %  Platelet Count - Automated : 152 K/uL  Mean Cell Volume : 96.8 fl  Mean Cell Hemoglobin : 33.9 pg  Mean Cell Hemoglobin Concentration : 35.1 gm/dL  Auto Neutrophil # : 8.31 K/uL  Auto Lymphocyte # : 0.75 K/uL  Auto Monocyte # : 1.45 K/uL  Auto Eosinophil # : 0.20 K/uL  Auto Basophil # : 0.02 K/uL  Auto Neutrophil % : 77.0 %  Auto Lymphocyte % : 7.0 %  Auto Monocyte % : 13.5 %  Auto Eosinophil % : 1.9 %  Auto Basophil % : 0.2 %    07-06    128<L>  |  96  |  66<H>  ----------------------------<  87  4.2   |  20<L>  |  2.54<H>    Ca    8.7      2024 06:57    TPro  7.6  /  Alb  3.1<L>  /  TBili  0.7  /  DBili  x   /  AST  37  /  ALT  40  /  AlkPhos  101  07-05            Impression:  * CASSIDY due to contrast ATN. Doubt scleroderma renal crisis.   * Possible CKD. Baseline Cr 1.2 in , echogenic kidneys on Renal US  * HypoNa -- CASSIDY, hypervolemia.   * HFpEF, mild LVH    Recommendations:   Stop saline  Obtain urgent CXR  Consider empiric diuretic.   Cardio input.  HPI:  Patient is a 69y old  Female with no known to her hx of CKD, Cr 1.20 in , h/o CREST syndrome, pleural effusion who presented to the ED few days ago c/o chest pain radiation to bl shoulders and across back.   Her admission Cr was 1.35 on . She had CTAngio of C/A/P with 90cc of IV Omnipaque. Cr jumped sharply within 24h of IV dye use. She received IVFs but developed acute dyspnea and currently requires NRBM.   Repeat Cr this morning reflects little change since yest possibly signifying early plateau.         PAST MEDICAL & SURGICAL HISTORY:  HTN (hypertension)  HLD (hyperlipidemia)  Calcinosis, Raynaud's phenomenon, sclerodactyly, and telangiectasia (CREST) syndrome  HNP (herniated nucleus pulposus), lumbar  History of pleural effusion  right  ; resolved  Leg swelling  Heart murmur  H/O  section    S/P cervical discectomy              FAMILY HISTORY:  Family history of diabetes mellitus in sister  FHx: heart disease  parents  Family history of abdominal aortic aneurysm (AAA)  mother        Allergies    No Known Allergies    Intolerances        MEDICATIONS  (STANDING):  acetaminophen     Tablet .. 975 milliGRAM(s) Oral every 8 hours  amLODIPine   Tablet 5 milliGRAM(s) Oral daily  apixaban 5 milliGRAM(s) Oral every 12 hours  atorvastatin 40 milliGRAM(s) Oral at bedtime  dronedarone 400 milliGRAM(s) Oral every 12 hours  gabapentin 100 milliGRAM(s) Oral at bedtime  lidocaine   4% Patch 2 Patch Transdermal daily  metoprolol succinate ER 50 milliGRAM(s) Oral daily  pantoprazole    Tablet 40 milliGRAM(s) Oral before breakfast  sodium chloride 0.9%. 1000 milliLiter(s) (75 mL/Hr) IV Continuous <Continuous>    MEDICATIONS  (PRN):  aluminum hydroxide/magnesium hydroxide/simethicone Suspension 30 milliLiter(s) Oral every 4 hours PRN Dyspepsia  melatonin 3 milliGRAM(s) Oral at bedtime PRN Insomnia  ondansetron Injectable 4 milliGRAM(s) IV Push every 8 hours PRN Nausea and/or Vomiting      Daily     Daily     Drug Dosing Weight  Height (cm): 165.1 (2024 11:27)  Weight (kg): 98.9 (2024 11:27)  BMI (kg/m2): 36.3 (2024 11:27)  BSA (m2): 2.05 (2024 11:27)      REVIEW OF SYSTEMS:    CONSTITUTIONAL: No fever, weight loss, or fatigue  ENMT:  No difficulty hearing, tinnitus, vertigo. No sinus or throat pain  NECK: No pain or stiffness  RESPIRATORY: No cough, wheezing, chills or hemoptysis. No shortness of breath  CARDIOVASCULAR: No chest pain, palpitations, dizziness, or leg swelling  GASTROINTESTINAL: No abdominal pain. No nausea, vomiting or hematemesis. No diarrhea or constipation. No melena or hematochezia.  GENITOURINARY: No dysuria, frequency, hematuria, or incontinence  SKIN: No itching, burning, rashes, or lesions   LYMPH NODES: No enlarged glands  NEURO: no asterixis            I&O's Detail    2024 07:01  -  2024 07:00  --------------------------------------------------------  IN:    sodium chloride 0.9%: 50 mL    sodium chloride 0.9%: 150 mL  Total IN: 200 mL    OUT:  Total OUT: 0 mL    Total NET: 200 mL          07-05 @ 07:01  -  07-06 @ 07:00  --------------------------------------------------------  IN: 200 mL / OUT: 0 mL / NET: 200 mL        PHYSICAL EXAM:    GENERAL: anxious, dyspneic, on NRBM  HEAD:  Atraumatic, normocephalic  NECK: Supple. No increase in JVP  NERVOUS SYSTEM:  Alert & Oriented X3. Motor Strength 5/5 B/L upper and lower extremities; DTRs 2+ intact and symmetric  CHEST/LUNG: crackles  HEART: Regular rate and rhythm. No murmurs, rubs, or gallops  ABDOMEN: Soft, Nontender, Nondistended. POS BS  EXTREMITIES:  pos edema.     LABS:  CBC Full  -  ( 2024 06:57 )  WBC Count : 10.77 K/uL  RBC Count : 3.39 M/uL  Hemoglobin : 11.5 g/dL  Hematocrit : 32.8 %  Platelet Count - Automated : 152 K/uL  Mean Cell Volume : 96.8 fl  Mean Cell Hemoglobin : 33.9 pg  Mean Cell Hemoglobin Concentration : 35.1 gm/dL  Auto Neutrophil # : 8.31 K/uL  Auto Lymphocyte # : 0.75 K/uL  Auto Monocyte # : 1.45 K/uL  Auto Eosinophil # : 0.20 K/uL  Auto Basophil # : 0.02 K/uL  Auto Neutrophil % : 77.0 %  Auto Lymphocyte % : 7.0 %  Auto Monocyte % : 13.5 %  Auto Eosinophil % : 1.9 %  Auto Basophil % : 0.2 %    07-    128<L>  |  96  |  66<H>  ----------------------------<  87  4.2   |  20<L>  |  2.54<H>    Ca    8.7      2024 06:57    TPro  7.6  /  Alb  3.1<L>  /  TBili  0.7  /  DBili  x   /  AST  37  /  ALT  40  /  AlkPhos  101  07-05            Impression:  * CASSIDY due to contrast ATN. Doubt scleroderma renal crisis.   * Possible CKD. Baseline Cr 1.2 in , echogenic kidneys on Renal US  * HypoNa -- CASSIDY, hypervolemia.   * HFpEF, mild LVH  * Respiratory failure.     Recommendations:   Stop saline  Obtain urgent CXR  Consider empiric diuretic.   Cardio input.

## 2024-07-07 LAB
ANION GAP SERPL CALC-SCNC: 11 MMOL/L — SIGNIFICANT CHANGE UP (ref 5–17)
BUN SERPL-MCNC: 53 MG/DL — HIGH (ref 7–23)
CALCIUM SERPL-MCNC: 9.3 MG/DL — SIGNIFICANT CHANGE UP (ref 8.4–10.5)
CHLORIDE SERPL-SCNC: 98 MMOL/L — SIGNIFICANT CHANGE UP (ref 96–108)
CO2 SERPL-SCNC: 22 MMOL/L — SIGNIFICANT CHANGE UP (ref 22–31)
CREAT SERPL-MCNC: 1.75 MG/DL — HIGH (ref 0.5–1.3)
EGFR: 31 ML/MIN/1.73M2 — LOW
GLUCOSE SERPL-MCNC: 93 MG/DL — SIGNIFICANT CHANGE UP (ref 70–99)
HCT VFR BLD CALC: 37.5 % — SIGNIFICANT CHANGE UP (ref 34.5–45)
HGB BLD-MCNC: 12.6 G/DL — SIGNIFICANT CHANGE UP (ref 11.5–15.5)
MAGNESIUM SERPL-MCNC: 1.9 MG/DL — SIGNIFICANT CHANGE UP (ref 1.6–2.6)
MCHC RBC-ENTMCNC: 32.5 PG — SIGNIFICANT CHANGE UP (ref 27–34)
MCHC RBC-ENTMCNC: 33.6 GM/DL — SIGNIFICANT CHANGE UP (ref 32–36)
MCV RBC AUTO: 96.6 FL — SIGNIFICANT CHANGE UP (ref 80–100)
NRBC # BLD: 0 /100 WBCS — SIGNIFICANT CHANGE UP (ref 0–0)
NT-PROBNP SERPL-SCNC: 4468 PG/ML — HIGH (ref 0–300)
PLATELET # BLD AUTO: 178 K/UL — SIGNIFICANT CHANGE UP (ref 150–400)
POTASSIUM SERPL-MCNC: 4.4 MMOL/L — SIGNIFICANT CHANGE UP (ref 3.5–5.3)
POTASSIUM SERPL-SCNC: 4.4 MMOL/L — SIGNIFICANT CHANGE UP (ref 3.5–5.3)
RBC # BLD: 3.88 M/UL — SIGNIFICANT CHANGE UP (ref 3.8–5.2)
RBC # FLD: 13.6 % — SIGNIFICANT CHANGE UP (ref 10.3–14.5)
SODIUM SERPL-SCNC: 131 MMOL/L — LOW (ref 135–145)
WBC # BLD: 8.24 K/UL — SIGNIFICANT CHANGE UP (ref 3.8–10.5)
WBC # FLD AUTO: 8.24 K/UL — SIGNIFICANT CHANGE UP (ref 3.8–10.5)

## 2024-07-07 PROCEDURE — 71045 X-RAY EXAM CHEST 1 VIEW: CPT | Mod: 26

## 2024-07-07 PROCEDURE — 99233 SBSQ HOSP IP/OBS HIGH 50: CPT

## 2024-07-07 RX ORDER — IPRATROPIUM BROMIDE AND ALBUTEROL SULFATE .5; 3 MG/3ML; MG/3ML
3 SOLUTION RESPIRATORY (INHALATION) ONCE
Refills: 0 | Status: COMPLETED | OUTPATIENT
Start: 2024-07-07 | End: 2024-07-07

## 2024-07-07 RX ORDER — METHYLPREDNISOLONE ACETATE 20 MG/ML
40 VIAL (ML) INJECTION EVERY 12 HOURS
Refills: 0 | Status: DISCONTINUED | OUTPATIENT
Start: 2024-07-07 | End: 2024-07-08

## 2024-07-07 RX ORDER — IPRATROPIUM BROMIDE AND ALBUTEROL SULFATE .5; 3 MG/3ML; MG/3ML
3 SOLUTION RESPIRATORY (INHALATION) EVERY 6 HOURS
Refills: 0 | Status: DISCONTINUED | OUTPATIENT
Start: 2024-07-07 | End: 2024-07-08

## 2024-07-07 RX ORDER — ALBUTEROL 90 MCG
2 AEROSOL REFILL (GRAM) INHALATION EVERY 6 HOURS
Refills: 0 | Status: DISCONTINUED | OUTPATIENT
Start: 2024-07-07 | End: 2024-07-08

## 2024-07-07 RX ORDER — IPRATROPIUM BROMIDE AND ALBUTEROL SULFATE .5; 3 MG/3ML; MG/3ML
3 SOLUTION RESPIRATORY (INHALATION) EVERY 6 HOURS
Refills: 0 | Status: DISCONTINUED | OUTPATIENT
Start: 2024-07-07 | End: 2024-07-07

## 2024-07-07 RX ADMIN — DRONEDARONE 400 MILLIGRAM(S): 400 TABLET, FILM COATED ORAL at 09:37

## 2024-07-07 RX ADMIN — IPRATROPIUM BROMIDE AND ALBUTEROL SULFATE 3 MILLILITER(S): .5; 3 SOLUTION RESPIRATORY (INHALATION) at 14:50

## 2024-07-07 RX ADMIN — IPRATROPIUM BROMIDE AND ALBUTEROL SULFATE 3 MILLILITER(S): .5; 3 SOLUTION RESPIRATORY (INHALATION) at 10:15

## 2024-07-07 RX ADMIN — Medication 975 MILLIGRAM(S): at 14:00

## 2024-07-07 RX ADMIN — Medication 50 MILLIGRAM(S): at 05:33

## 2024-07-07 RX ADMIN — Medication 975 MILLIGRAM(S): at 06:00

## 2024-07-07 RX ADMIN — Medication 975 MILLIGRAM(S): at 05:32

## 2024-07-07 RX ADMIN — Medication 40 MILLIGRAM(S): at 17:48

## 2024-07-07 RX ADMIN — Medication 975 MILLIGRAM(S): at 13:49

## 2024-07-07 RX ADMIN — ATORVASTATIN CALCIUM 40 MILLIGRAM(S): 20 TABLET, FILM COATED ORAL at 21:21

## 2024-07-07 RX ADMIN — IPRATROPIUM BROMIDE AND ALBUTEROL SULFATE 3 MILLILITER(S): .5; 3 SOLUTION RESPIRATORY (INHALATION) at 21:46

## 2024-07-07 RX ADMIN — DRONEDARONE 400 MILLIGRAM(S): 400 TABLET, FILM COATED ORAL at 20:52

## 2024-07-07 RX ADMIN — AMLODIPINE BESYLATE 5 MILLIGRAM(S): 2.5 TABLET ORAL at 05:33

## 2024-07-07 RX ADMIN — APIXABAN 5 MILLIGRAM(S): 5 TABLET, FILM COATED ORAL at 21:21

## 2024-07-07 RX ADMIN — PANTOPRAZOLE SODIUM 40 MILLIGRAM(S): 40 INJECTION, POWDER, FOR SOLUTION INTRAVENOUS at 05:33

## 2024-07-07 RX ADMIN — APIXABAN 5 MILLIGRAM(S): 5 TABLET, FILM COATED ORAL at 09:37

## 2024-07-07 NOTE — PROGRESS NOTE ADULT - ASSESSMENT
69 year old female with past medical history of CREST syndrome, GERD, HTN, HLD, A.fib, s/p loop recorder (2/2024), Left PCA CVA (11/2023), cervical/lumbar spine surgery several years ago, sciatica, who presents from home for 2 day history of upper back pain and throat pain.    #Upper Back Pain 2/2 cervical spinal stenosis/muscle spasms  #History of Cervical Spine Surgery  - CTA chest/abd/pelvis showed No aortic aneurysm or dissection. No pulmonary emboli. 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 Moderate to severe left C6-7 foraminal stenosis. Moderate left T9-10 foraminal stenosis.  - Pain control: trial of tylenol ATC, lidocaine patches, gabapentin 100mg QHS  - Fall precaution  - PT - no PT needs   - Outpatient orthopedic/neurosurgery evaluation     #Esophageal Pain  #GERD  - history of CREST syndrome, possible esophageal dysmotility  - Denies dysphagia/odynophagia  - CTA chest negative for PE, CT abd/pelvis unremarkable  - Continue PPI     #Leukocytosis - resolved  - May be reactive, afebrile, no focal complaints  - WBC much improved, no infectious process apparent    #Mild Hyponatremia  #CASSIDY; Cr 1.3 on admit   - Sodium level ranged 129-133 last admission  - Baseline Cr appears to be ~1.0 range  - renal consult appreciated, CASSIDY due to contrast ATN, doubt scleroderma renal crisis  - Renal sono showed echogenic kidneys, stopped IVF, follow up CXR as per renal  - Continue to hold HCRTZ and ARB, encourage po intake, avoid nephrotoxins  - Serum Cr and Na improving, monitor BMP    #Elevated BNP  - Trop neg x 2, EKG showed A.flutter HR 90, non-specific T wave abn  - BNP 3094, no evidence of fluid overload on exam  - Echo 11/2023 showed EF 65 to 70%, no significant valvular disease  - Monitor on telemetry  - echo - Left atrial enlargement. LVEF 55 to 60%. There is mild concentric left ventricular hypertrophy. Right heart enlargement Thickening of the anterior and posterior mitral valve leaflets. Trace mitral valve regurgitation.    #HTN/HLD  - Will hold HCTz and ARB given CASSIDY as above  - Continue norvasc, metoprolol  - Monitor BP  - Continue atorvastatin   - Resume zetia on discharge     #A.fib/A.flutter   #Status Post Loop Recorder  - Trop neg x 2, EKG showed A.flutter HR 90, non-specific T wave abn  - Telemetry monitoring  - Continue multaq and metoprolol  - Continue Eliquis     #History of Left PCA CVA  - Continue Eliquis and atorvastatin     #Prediabetes  - HbA1C 5.8 in 11/2023  - Will repeat HbA1C  - Dietary and lifestyle modification     #DVT PPx  - On Eliquis    Dispo: pending improvement of clinicals     Plan of care discussed with patient and  at bedside, they are in agreement, all questions are answered

## 2024-07-07 NOTE — PROGRESS NOTE ADULT - SUBJECTIVE AND OBJECTIVE BOX
Subjective: less SOB today. Supplemental O2 down to NC      MEDICATIONS  (STANDING):  albuterol    90 MICROgram(s) HFA Inhaler 2 Puff(s) Inhalation every 6 hours  albuterol/ipratropium for Nebulization 3 milliLiter(s) Nebulizer every 6 hours  amLODIPine   Tablet 5 milliGRAM(s) Oral daily  apixaban 5 milliGRAM(s) Oral every 12 hours  atorvastatin 40 milliGRAM(s) Oral at bedtime  dronedarone 400 milliGRAM(s) Oral every 12 hours  gabapentin 100 milliGRAM(s) Oral at bedtime  lidocaine   4% Patch 2 Patch Transdermal daily  methylPREDNISolone sodium succinate Injectable 40 milliGRAM(s) IV Push every 12 hours  metoprolol succinate ER 50 milliGRAM(s) Oral daily  pantoprazole    Tablet 40 milliGRAM(s) Oral before breakfast    MEDICATIONS  (PRN):  aluminum hydroxide/magnesium hydroxide/simethicone Suspension 30 milliLiter(s) Oral every 4 hours PRN Dyspepsia  melatonin 3 milliGRAM(s) Oral at bedtime PRN Insomnia  ondansetron Injectable 4 milliGRAM(s) IV Push every 8 hours PRN Nausea and/or Vomiting          T(C): 36.4 (07-07-24 @ 11:59), Max: 36.9 (07-07-24 @ 05:28)  HR: 81 (07-07-24 @ 14:51) (69 - 95)  BP: 123/77 (07-07-24 @ 11:59) (115/80 - 130/59)  RR: 18 (07-07-24 @ 11:59) (18 - 26)  SpO2: 97% (07-07-24 @ 14:51) (93% - 100%)  Wt(kg): --           PHYSICAL EXAM:    GENERAL: min dyspne at rest.   NECK: Supple, no inc in JVP  CHEST/LUNG: dec BS  HEART: S1S2  ABDOMEN: Soft, Nontender, Nondistended; Bowel sounds present  EXTREMITIES:  pos edema.   NEURO: no asterixis      LABS:  CBC Full  -  ( 07 Jul 2024 06:11 )  WBC Count : 8.24 K/uL  RBC Count : 3.88 M/uL  Hemoglobin : 12.6 g/dL  Hematocrit : 37.5 %  Platelet Count - Automated : 178 K/uL  Mean Cell Volume : 96.6 fl  Mean Cell Hemoglobin : 32.5 pg  Mean Cell Hemoglobin Concentration : 33.6 gm/dL  Auto Neutrophil # : x  Auto Lymphocyte # : x  Auto Monocyte # : x  Auto Eosinophil # : x  Auto Basophil # : x  Auto Neutrophil % : x  Auto Lymphocyte % : x  Auto Monocyte % : x  Auto Eosinophil % : x  Auto Basophil % : x    07-07    131<L>  |  98  |  53<H>  ----------------------------<  93  4.4   |  22  |  1.75<H>    Ca    9.3      07 Jul 2024 06:11  Mg     1.9     07-07        Impression:  * CASSIDY due to contrast ATN. Doubt scleroderma renal crisis. Improving from peak of 2.54.   * Possible CKD. Baseline Cr 1.2 in Nov of 23, echogenic kidneys on Renal US  * HypoNa -- CASSIDY, hypervolemia.   * HFpEF, mild LVH  * Respiratory failure.     Recommendations:   CXR  Start PO Lasix 40mg daily  Cardio input.

## 2024-07-07 NOTE — PROGRESS NOTE ADULT - SUBJECTIVE AND OBJECTIVE BOX
Patient is a 69y old  Female who presents with a chief complaint of Upper Back/Throat Pain (06 Jul 2024 16:20)    Patient seen and examined at bedside.  no acute overnight events    ALLERGIES:  No Known Allergies        Vital Signs Last 24 Hrs  T(F): 98.4 (07 Jul 2024 05:28), Max: 98.4 (07 Jul 2024 05:28)  HR: 95 (07 Jul 2024 10:15) (69 - 95)  BP: 130/59 (07 Jul 2024 05:28) (115/80 - 130/59)  RR: 26 (07 Jul 2024 05:28) (20 - 26)  SpO2: 95% (07 Jul 2024 10:15) (92% - 96%)  I&O's Summary    MEDICATIONS:  acetaminophen     Tablet .. 975 milliGRAM(s) Oral every 8 hours  albuterol/ipratropium for Nebulization 3 milliLiter(s) Nebulizer every 6 hours PRN  aluminum hydroxide/magnesium hydroxide/simethicone Suspension 30 milliLiter(s) Oral every 4 hours PRN  amLODIPine   Tablet 5 milliGRAM(s) Oral daily  apixaban 5 milliGRAM(s) Oral every 12 hours  atorvastatin 40 milliGRAM(s) Oral at bedtime  dronedarone 400 milliGRAM(s) Oral every 12 hours  gabapentin 100 milliGRAM(s) Oral at bedtime  lidocaine   4% Patch 2 Patch Transdermal daily  melatonin 3 milliGRAM(s) Oral at bedtime PRN  metoprolol succinate ER 50 milliGRAM(s) Oral daily  ondansetron Injectable 4 milliGRAM(s) IV Push every 8 hours PRN  pantoprazole    Tablet 40 milliGRAM(s) Oral before breakfast      PHYSICAL EXAM:  General: NAD, A/O x 3  ENT: MMM, no thrush  Neck: Supple, No JVD  Lungs: Clear to auscultation bilaterally, bilateral air entry, fair inspiratory effort  Cardio: RRR, S1/S2, No murmurs  Abdomen: Soft, Nontender, Nondistended; Bowel sounds present  Extremities: No cyanosis, No edema    LABS:                        12.6   8.24  )-----------( 178      ( 07 Jul 2024 06:11 )             37.5     07-07    131  |  98  |  53  ----------------------------<  93  4.4   |  22  |  1.75    Ca    9.3      07 Jul 2024 06:11  Mg     1.9     07-07    TPro  7.6  /  Alb  3.1  /  TBili  0.7  /  DBili  x   /  AST  37  /  ALT  40  /  AlkPhos  101  07-05      PT/INR - ( 04 Jul 2024 11:10 )   PT: 19.0 sec;   INR: 1.65 ratio         PTT - ( 04 Jul 2024 11:10 )  PTT:48.3 sec    CARDIAC MARKERS ( 04 Jul 2024 12:40 )  x     / <4.0 ng/L / x     / x     / x      CARDIAC MARKERS ( 04 Jul 2024 11:10 )  x     / 4.0 ng/L / x     / x     / x                            Urinalysis Basic - ( 07 Jul 2024 06:11 )    Color: x / Appearance: x / SG: x / pH: x  Gluc: 93 mg/dL / Ketone: x  / Bili: x / Urobili: x   Blood: x / Protein: x / Nitrite: x   Leuk Esterase: x / RBC: x / WBC x   Sq Epi: x / Non Sq Epi: x / Bacteria: x            RADIOLOGY & ADDITIONAL TESTS:    Care Discussed with Consultants/Other Providers:

## 2024-07-08 LAB
ANION GAP SERPL CALC-SCNC: 10 MMOL/L — SIGNIFICANT CHANGE UP (ref 5–17)
BUN SERPL-MCNC: 37 MG/DL — HIGH (ref 7–23)
CALCIUM SERPL-MCNC: 9.5 MG/DL — SIGNIFICANT CHANGE UP (ref 8.4–10.5)
CHLORIDE SERPL-SCNC: 98 MMOL/L — SIGNIFICANT CHANGE UP (ref 96–108)
CO2 SERPL-SCNC: 20 MMOL/L — LOW (ref 22–31)
CREAT SERPL-MCNC: 1.15 MG/DL — SIGNIFICANT CHANGE UP (ref 0.5–1.3)
EGFR: 51 ML/MIN/1.73M2 — LOW
GLUCOSE SERPL-MCNC: 185 MG/DL — HIGH (ref 70–99)
HCT VFR BLD CALC: 37.9 % — SIGNIFICANT CHANGE UP (ref 34.5–45)
HGB BLD-MCNC: 13 G/DL — SIGNIFICANT CHANGE UP (ref 11.5–15.5)
MCHC RBC-ENTMCNC: 32.8 PG — SIGNIFICANT CHANGE UP (ref 27–34)
MCHC RBC-ENTMCNC: 34.3 GM/DL — SIGNIFICANT CHANGE UP (ref 32–36)
MCV RBC AUTO: 95.7 FL — SIGNIFICANT CHANGE UP (ref 80–100)
NRBC # BLD: 0 /100 WBCS — SIGNIFICANT CHANGE UP (ref 0–0)
PLATELET # BLD AUTO: 197 K/UL — SIGNIFICANT CHANGE UP (ref 150–400)
POTASSIUM SERPL-MCNC: 4.8 MMOL/L — SIGNIFICANT CHANGE UP (ref 3.5–5.3)
POTASSIUM SERPL-SCNC: 4.8 MMOL/L — SIGNIFICANT CHANGE UP (ref 3.5–5.3)
RBC # BLD: 3.96 M/UL — SIGNIFICANT CHANGE UP (ref 3.8–5.2)
RBC # FLD: 13.5 % — SIGNIFICANT CHANGE UP (ref 10.3–14.5)
SODIUM SERPL-SCNC: 128 MMOL/L — LOW (ref 135–145)
WBC # BLD: 6.69 K/UL — SIGNIFICANT CHANGE UP (ref 3.8–10.5)
WBC # FLD AUTO: 6.69 K/UL — SIGNIFICANT CHANGE UP (ref 3.8–10.5)

## 2024-07-08 PROCEDURE — 99232 SBSQ HOSP IP/OBS MODERATE 35: CPT

## 2024-07-08 RX ORDER — PREDNISONE 10 MG/1
40 TABLET ORAL DAILY
Refills: 0 | Status: DISCONTINUED | OUTPATIENT
Start: 2024-07-08 | End: 2024-07-09

## 2024-07-08 RX ORDER — BENZOCAINE AND MENTHOL 15; 3.6 MG/1; MG/1
1 LOZENGE ORAL THREE TIMES A DAY
Refills: 0 | Status: DISCONTINUED | OUTPATIENT
Start: 2024-07-08 | End: 2024-07-09

## 2024-07-08 RX ORDER — FLUTICASONE PROPIONATE 50 UG/1
1 SPRAY, METERED NASAL
Refills: 0 | Status: DISCONTINUED | OUTPATIENT
Start: 2024-07-08 | End: 2024-07-09

## 2024-07-08 RX ORDER — LEVALBUTEROL HYDROCHLORIDE 1.25 MG/3ML
0.63 SOLUTION RESPIRATORY (INHALATION) EVERY 6 HOURS
Refills: 0 | Status: DISCONTINUED | OUTPATIENT
Start: 2024-07-08 | End: 2024-07-09

## 2024-07-08 RX ADMIN — PREDNISONE 40 MILLIGRAM(S): 10 TABLET ORAL at 17:30

## 2024-07-08 RX ADMIN — LEVALBUTEROL HYDROCHLORIDE 0.63 MILLIGRAM(S): 1.25 SOLUTION RESPIRATORY (INHALATION) at 21:21

## 2024-07-08 RX ADMIN — PANTOPRAZOLE SODIUM 40 MILLIGRAM(S): 40 INJECTION, POWDER, FOR SOLUTION INTRAVENOUS at 05:51

## 2024-07-08 RX ADMIN — DRONEDARONE 400 MILLIGRAM(S): 400 TABLET, FILM COATED ORAL at 09:11

## 2024-07-08 RX ADMIN — FLUTICASONE PROPIONATE 1 SPRAY(S): 50 SPRAY, METERED NASAL at 16:13

## 2024-07-08 RX ADMIN — BENZOCAINE AND MENTHOL 1 LOZENGE: 15; 3.6 LOZENGE ORAL at 16:13

## 2024-07-08 RX ADMIN — IPRATROPIUM BROMIDE AND ALBUTEROL SULFATE 3 MILLILITER(S): .5; 3 SOLUTION RESPIRATORY (INHALATION) at 09:06

## 2024-07-08 RX ADMIN — APIXABAN 5 MILLIGRAM(S): 5 TABLET, FILM COATED ORAL at 22:28

## 2024-07-08 RX ADMIN — IPRATROPIUM BROMIDE AND ALBUTEROL SULFATE 3 MILLILITER(S): .5; 3 SOLUTION RESPIRATORY (INHALATION) at 03:20

## 2024-07-08 RX ADMIN — AMLODIPINE BESYLATE 5 MILLIGRAM(S): 2.5 TABLET ORAL at 05:51

## 2024-07-08 RX ADMIN — LEVALBUTEROL HYDROCHLORIDE 0.63 MILLIGRAM(S): 1.25 SOLUTION RESPIRATORY (INHALATION) at 16:22

## 2024-07-08 RX ADMIN — ATORVASTATIN CALCIUM 40 MILLIGRAM(S): 20 TABLET, FILM COATED ORAL at 22:28

## 2024-07-08 RX ADMIN — FLUTICASONE PROPIONATE 1 SPRAY(S): 50 SPRAY, METERED NASAL at 20:41

## 2024-07-08 RX ADMIN — APIXABAN 5 MILLIGRAM(S): 5 TABLET, FILM COATED ORAL at 09:11

## 2024-07-08 RX ADMIN — Medication 40 MILLIGRAM(S): at 05:51

## 2024-07-08 RX ADMIN — Medication 50 MILLIGRAM(S): at 05:51

## 2024-07-08 RX ADMIN — DRONEDARONE 400 MILLIGRAM(S): 400 TABLET, FILM COATED ORAL at 20:45

## 2024-07-08 RX ADMIN — BENZOCAINE AND MENTHOL 1 LOZENGE: 15; 3.6 LOZENGE ORAL at 22:28

## 2024-07-08 NOTE — PROGRESS NOTE ADULT - SUBJECTIVE AND OBJECTIVE BOX
Comfortable on RA    Vital Signs Last 24 Hrs  T(C): 36.3 (07-08-24 @ 20:37), Max: 36.9 (07-08-24 @ 06:40)  T(F): 97.4 (07-08-24 @ 20:37), Max: 98.4 (07-08-24 @ 06:40)  HR: 102 (07-08-24 @ 20:37) (102 - 105)  BP: 152/98 (07-08-24 @ 20:37) (147/84 - 163/83)  RR: 21 (07-08-24 @ 20:37) (19 - 21)  SpO2: 98% (07-08-24 @ 20:37) (89% - 99%)    s1s2  b/l air entry  soft, ND  sm edema  AO                        13.0   6.69  )-----------( 197      ( 08 Jul 2024 06:08 )             37.9     08 Jul 2024 06:08    128    |  98     |  37     ----------------------------<  185    4.8     |  20     |  1.15     Ca    9.5        08 Jul 2024 06:08  Mg     1.9       07 Jul 2024 06:11    aluminum hydroxide/magnesium hydroxide/simethicone Suspension 30 milliLiter(s) Oral every 4 hours PRN  amLODIPine   Tablet 5 milliGRAM(s) Oral daily  apixaban 5 milliGRAM(s) Oral every 12 hours  atorvastatin 40 milliGRAM(s) Oral at bedtime  benzocaine/menthol Lozenge 1 Lozenge Oral three times a day  dronedarone 400 milliGRAM(s) Oral every 12 hours  fluticasone propionate 50 MICROgram(s)/spray Nasal Spray 1 Spray(s) Both Nostrils two times a day  gabapentin 100 milliGRAM(s) Oral at bedtime  levalbuterol Inhalation 0.63 milliGRAM(s) Inhalation every 6 hours  lidocaine   4% Patch 2 Patch Transdermal daily  melatonin 3 milliGRAM(s) Oral at bedtime PRN  metoprolol succinate ER 50 milliGRAM(s) Oral daily  ondansetron Injectable 4 milliGRAM(s) IV Push every 8 hours PRN  pantoprazole    Tablet 40 milliGRAM(s) Oral before breakfast  predniSONE   Tablet 40 milliGRAM(s) Oral daily    Impression/Plan:    S/p CT w/IV dye 7/4, contrast CASSIDY  Cr is improving from peak of 2.54  Possible CKD, echogenic kidneys on Renal US  HypoNa iso CASSIDY, hypervolemia   HFpEF, mild LVH  Pls avoid HCTZ given hyponatremia  FR 1L/day  BMP, TSH, uric acid in am  If Na is not improving, will order Tolvaptan    [phone]

## 2024-07-08 NOTE — PROGRESS NOTE ADULT - ASSESSMENT
69 year old female with past medical history of CREST syndrome, GERD, HTN, HLD, A.fib, s/p loop recorder (2/2024), Left PCA CVA (11/2023), cervical/lumbar spine surgery several years ago, sciatica, who presents from home for 2 day history of upper back pain and throat pain.    #Upper Back Pain 2/2 cervical spinal stenosis/muscle spasms  #History of Cervical Spine Surgery  - CTA chest/abd/pelvis showed No aortic aneurysm or dissection. No pulmonary emboli. 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 Moderate to severe left C6-7 foraminal stenosis. Moderate left T9-10 foraminal stenosis.  - Pain control: trial of tylenol ATC, lidocaine patches, gabapentin 100mg QHS  - Fall precaution  - PT - no PT needs   - Outpatient orthopedic/neurosurgery evaluation     #Esophageal Pain  #GERD  - history of CREST syndrome, possible esophageal dysmotility  - Denies dysphagia/odynophagia  - CTA chest negative for PE, CT abd/pelvis unremarkable  - Continue PPI     #Leukocytosis - resolved  - May be reactive, afebrile, no focal complaints  - WBC much improved, no infectious process apparent    #Mild Hyponatremia  #CASSIDY; Cr 1.3 on admit   - Sodium level ranged 129-133 last admission  - Baseline Cr appears to be ~1.0 range  - renal consult appreciated, CASSIDY due to contrast ATN, doubt scleroderma renal crisis  - Renal sono showed echogenic kidneys, stopped IVF, CXR done and reviewed  - Restarted HCTZ today (pt reports low tolerance to lasix), restart Candesartan on discharge, encourage po intake, avoid nephrotoxins  - Monitor BMP, Serum Cr at or near baseline    #Elevated BNP  - Trop neg x 2, EKG showed A.flutter HR 90, non-specific T wave abn  - BNP 3094, no evidence of fluid overload on exam  - Echo 11/2023 showed EF 65 to 70%, no significant valvular disease  - Monitor on telemetry  - echo - Left atrial enlargement. LVEF 55 to 60%. There is mild concentric left ventricular hypertrophy. Right heart enlargement Thickening of the anterior and posterior mitral valve leaflets. Trace mitral valve regurgitation.    #HTN/HLD  - Will hold HCTz and ARB given CASSIDY as above  - Continue norvasc, metoprolol  - Monitor BP  - Continue atorvastatin   - Resume zetia on discharge     #A.fib/A.flutter   #Status Post Loop Recorder  - Trop neg x 2, EKG showed A.flutter HR 90, non-specific T wave abn  - Telemetry monitoring  - Continue multaq and metoprolol  - Continue Eliquis     #History of Left PCA CVA  - Continue Eliquis and atorvastatin     #Prediabetes  - HbA1C 5.8 in 11/2023  - Will repeat HbA1C  - Dietary and lifestyle modification     #DVT PPx  - On Eliquis    Dispo: Anticipate medical clearance ~ 24 hours    Plan of care discussed with patient and  at bedside, they are in agreement, all questions are answered

## 2024-07-08 NOTE — PROGRESS NOTE ADULT - SUBJECTIVE AND OBJECTIVE BOX
Patient is a 69y old  Female who presents with a chief complaint of Upper Back/Throat Pain (07 Jul 2024 14:54)    Patient seen and examined at bedside.  no acute events overnight    ALLERGIES:  No Known Allergies        Vital Signs Last 24 Hrs  T(F): 98.4 (08 Jul 2024 06:40), Max: 98.4 (07 Jul 2024 21:21)  HR: 105 (08 Jul 2024 06:40) (80 - 107)  BP: 147/84 (08 Jul 2024 06:40) (123/77 - 147/84)  RR: 21 (08 Jul 2024 06:40) (18 - 24)  SpO2: 98% (08 Jul 2024 06:40) (95% - 100%)  I&O's Summary    MEDICATIONS:  albuterol    90 MICROgram(s) HFA Inhaler 2 Puff(s) Inhalation every 6 hours  albuterol/ipratropium for Nebulization 3 milliLiter(s) Nebulizer every 6 hours  aluminum hydroxide/magnesium hydroxide/simethicone Suspension 30 milliLiter(s) Oral every 4 hours PRN  amLODIPine   Tablet 5 milliGRAM(s) Oral daily  apixaban 5 milliGRAM(s) Oral every 12 hours  atorvastatin 40 milliGRAM(s) Oral at bedtime  dronedarone 400 milliGRAM(s) Oral every 12 hours  gabapentin 100 milliGRAM(s) Oral at bedtime  hydrochlorothiazide 25 milliGRAM(s) Oral daily  lidocaine   4% Patch 2 Patch Transdermal daily  melatonin 3 milliGRAM(s) Oral at bedtime PRN  methylPREDNISolone sodium succinate Injectable 40 milliGRAM(s) IV Push every 12 hours  metoprolol succinate ER 50 milliGRAM(s) Oral daily  ondansetron Injectable 4 milliGRAM(s) IV Push every 8 hours PRN  pantoprazole    Tablet 40 milliGRAM(s) Oral before breakfast      PHYSICAL EXAM:  General: NAD, A/O x 3  ENT: MMM, no thrush  Neck: Supple, No JVD  Lungs: Clear to auscultation bilaterally, non labored, good air entry  Cardio: RRR, S1/S2, No murmurs  Abdomen: Soft, Nontender, Nondistended; Bowel sounds present  Extremities: No cyanosis, Mild LE edema L>R    LABS:                        13.0   6.69  )-----------( 197      ( 08 Jul 2024 06:08 )             37.9     07-08    128  |  98  |  37  ----------------------------<  185  4.8   |  20  |  1.15    Ca    9.5      08 Jul 2024 06:08  Mg     1.9     07-07                                  Urinalysis Basic - ( 08 Jul 2024 06:08 )    Color: x / Appearance: x / SG: x / pH: x  Gluc: 185 mg/dL / Ketone: x  / Bili: x / Urobili: x   Blood: x / Protein: x / Nitrite: x   Leuk Esterase: x / RBC: x / WBC x   Sq Epi: x / Non Sq Epi: x / Bacteria: x            RADIOLOGY & ADDITIONAL TESTS:    Care Discussed with Consultants/Other Providers:

## 2024-07-09 ENCOUNTER — TRANSCRIPTION ENCOUNTER (OUTPATIENT)
Age: 69
End: 2024-07-09

## 2024-07-09 VITALS
DIASTOLIC BLOOD PRESSURE: 87 MMHG | TEMPERATURE: 98 F | HEART RATE: 77 BPM | OXYGEN SATURATION: 93 % | RESPIRATION RATE: 21 BRPM | SYSTOLIC BLOOD PRESSURE: 144 MMHG

## 2024-07-09 LAB
ANION GAP SERPL CALC-SCNC: 8 MMOL/L — SIGNIFICANT CHANGE UP (ref 5–17)
BASOPHILS # BLD AUTO: 0.01 K/UL — SIGNIFICANT CHANGE UP (ref 0–0.2)
BASOPHILS NFR BLD AUTO: 0.1 % — SIGNIFICANT CHANGE UP (ref 0–2)
BUN SERPL-MCNC: 35 MG/DL — HIGH (ref 7–23)
CALCIUM SERPL-MCNC: 9.6 MG/DL — SIGNIFICANT CHANGE UP (ref 8.4–10.5)
CHLORIDE SERPL-SCNC: 100 MMOL/L — SIGNIFICANT CHANGE UP (ref 96–108)
CO2 SERPL-SCNC: 25 MMOL/L — SIGNIFICANT CHANGE UP (ref 22–31)
CREAT SERPL-MCNC: 1.16 MG/DL — SIGNIFICANT CHANGE UP (ref 0.5–1.3)
EGFR: 51 ML/MIN/1.73M2 — LOW
EOSINOPHIL # BLD AUTO: 0 K/UL — SIGNIFICANT CHANGE UP (ref 0–0.5)
EOSINOPHIL NFR BLD AUTO: 0 % — SIGNIFICANT CHANGE UP (ref 0–6)
GLUCOSE SERPL-MCNC: 170 MG/DL — HIGH (ref 70–99)
HCT VFR BLD CALC: 36.1 % — SIGNIFICANT CHANGE UP (ref 34.5–45)
HGB BLD-MCNC: 12.4 G/DL — SIGNIFICANT CHANGE UP (ref 11.5–15.5)
IMM GRANULOCYTES NFR BLD AUTO: 0.4 % — SIGNIFICANT CHANGE UP (ref 0–0.9)
LYMPHOCYTES # BLD AUTO: 0.42 K/UL — LOW (ref 1–3.3)
LYMPHOCYTES # BLD AUTO: 4.1 % — LOW (ref 13–44)
MCHC RBC-ENTMCNC: 32.3 PG — SIGNIFICANT CHANGE UP (ref 27–34)
MCHC RBC-ENTMCNC: 34.3 GM/DL — SIGNIFICANT CHANGE UP (ref 32–36)
MCV RBC AUTO: 94 FL — SIGNIFICANT CHANGE UP (ref 80–100)
MONOCYTES # BLD AUTO: 0.77 K/UL — SIGNIFICANT CHANGE UP (ref 0–0.9)
MONOCYTES NFR BLD AUTO: 7.5 % — SIGNIFICANT CHANGE UP (ref 2–14)
NEUTROPHILS # BLD AUTO: 9.09 K/UL — HIGH (ref 1.8–7.4)
NEUTROPHILS NFR BLD AUTO: 87.9 % — HIGH (ref 43–77)
NRBC # BLD: 0 /100 WBCS — SIGNIFICANT CHANGE UP (ref 0–0)
NT-PROBNP SERPL-SCNC: 5605 PG/ML — HIGH (ref 0–300)
PLATELET # BLD AUTO: 240 K/UL — SIGNIFICANT CHANGE UP (ref 150–400)
POTASSIUM SERPL-MCNC: 4.8 MMOL/L — SIGNIFICANT CHANGE UP (ref 3.5–5.3)
POTASSIUM SERPL-SCNC: 4.8 MMOL/L — SIGNIFICANT CHANGE UP (ref 3.5–5.3)
RBC # BLD: 3.84 M/UL — SIGNIFICANT CHANGE UP (ref 3.8–5.2)
RBC # FLD: 14 % — SIGNIFICANT CHANGE UP (ref 10.3–14.5)
SODIUM SERPL-SCNC: 133 MMOL/L — LOW (ref 135–145)
TSH SERPL-MCNC: 0.54 UIU/ML — SIGNIFICANT CHANGE UP (ref 0.36–3.74)
URATE SERPL-MCNC: 7 MG/DL — SIGNIFICANT CHANGE UP (ref 2.5–7)
WBC # BLD: 10.33 K/UL — SIGNIFICANT CHANGE UP (ref 3.8–10.5)
WBC # FLD AUTO: 10.33 K/UL — SIGNIFICANT CHANGE UP (ref 3.8–10.5)

## 2024-07-09 PROCEDURE — 94760 N-INVAS EAR/PLS OXIMETRY 1: CPT

## 2024-07-09 PROCEDURE — 86901 BLOOD TYPING SEROLOGIC RH(D): CPT

## 2024-07-09 PROCEDURE — 84484 ASSAY OF TROPONIN QUANT: CPT

## 2024-07-09 PROCEDURE — 84300 ASSAY OF URINE SODIUM: CPT

## 2024-07-09 PROCEDURE — 71045 X-RAY EXAM CHEST 1 VIEW: CPT

## 2024-07-09 PROCEDURE — 85730 THROMBOPLASTIN TIME PARTIAL: CPT

## 2024-07-09 PROCEDURE — 99285 EMERGENCY DEPT VISIT HI MDM: CPT

## 2024-07-09 PROCEDURE — 96374 THER/PROPH/DIAG INJ IV PUSH: CPT

## 2024-07-09 PROCEDURE — 80053 COMPREHEN METABOLIC PANEL: CPT

## 2024-07-09 PROCEDURE — 83935 ASSAY OF URINE OSMOLALITY: CPT

## 2024-07-09 PROCEDURE — 86850 RBC ANTIBODY SCREEN: CPT

## 2024-07-09 PROCEDURE — 84133 ASSAY OF URINE POTASSIUM: CPT

## 2024-07-09 PROCEDURE — 83735 ASSAY OF MAGNESIUM: CPT

## 2024-07-09 PROCEDURE — 82436 ASSAY OF URINE CHLORIDE: CPT

## 2024-07-09 PROCEDURE — 83880 ASSAY OF NATRIURETIC PEPTIDE: CPT

## 2024-07-09 PROCEDURE — 84540 ASSAY OF URINE/UREA-N: CPT

## 2024-07-09 PROCEDURE — 96376 TX/PRO/DX INJ SAME DRUG ADON: CPT

## 2024-07-09 PROCEDURE — 72125 CT NECK SPINE W/O DYE: CPT | Mod: MC

## 2024-07-09 PROCEDURE — 82570 ASSAY OF URINE CREATININE: CPT

## 2024-07-09 PROCEDURE — 85025 COMPLETE CBC W/AUTO DIFF WBC: CPT

## 2024-07-09 PROCEDURE — 83036 HEMOGLOBIN GLYCOSYLATED A1C: CPT

## 2024-07-09 PROCEDURE — 84156 ASSAY OF PROTEIN URINE: CPT

## 2024-07-09 PROCEDURE — 86900 BLOOD TYPING SEROLOGIC ABO: CPT

## 2024-07-09 PROCEDURE — 83690 ASSAY OF LIPASE: CPT

## 2024-07-09 PROCEDURE — 84550 ASSAY OF BLOOD/URIC ACID: CPT

## 2024-07-09 PROCEDURE — 87086 URINE CULTURE/COLONY COUNT: CPT

## 2024-07-09 PROCEDURE — 71275 CT ANGIOGRAPHY CHEST: CPT | Mod: MC

## 2024-07-09 PROCEDURE — 85027 COMPLETE CBC AUTOMATED: CPT

## 2024-07-09 PROCEDURE — 76775 US EXAM ABDO BACK WALL LIM: CPT

## 2024-07-09 PROCEDURE — 84443 ASSAY THYROID STIM HORMONE: CPT

## 2024-07-09 PROCEDURE — 96375 TX/PRO/DX INJ NEW DRUG ADDON: CPT

## 2024-07-09 PROCEDURE — 87186 SC STD MICRODIL/AGAR DIL: CPT

## 2024-07-09 PROCEDURE — 85610 PROTHROMBIN TIME: CPT

## 2024-07-09 PROCEDURE — 87077 CULTURE AEROBIC IDENTIFY: CPT

## 2024-07-09 PROCEDURE — 0225U NFCT DS DNA&RNA 21 SARSCOV2: CPT

## 2024-07-09 PROCEDURE — 99239 HOSP IP/OBS DSCHRG MGMT >30: CPT

## 2024-07-09 PROCEDURE — 81001 URINALYSIS AUTO W/SCOPE: CPT

## 2024-07-09 PROCEDURE — 74174 CTA ABD&PLVS W/CONTRAST: CPT | Mod: MC

## 2024-07-09 PROCEDURE — 93005 ELECTROCARDIOGRAM TRACING: CPT

## 2024-07-09 PROCEDURE — 36415 COLL VENOUS BLD VENIPUNCTURE: CPT

## 2024-07-09 PROCEDURE — 94640 AIRWAY INHALATION TREATMENT: CPT

## 2024-07-09 PROCEDURE — 72128 CT CHEST SPINE W/O DYE: CPT | Mod: MC

## 2024-07-09 PROCEDURE — 93306 TTE W/DOPPLER COMPLETE: CPT

## 2024-07-09 PROCEDURE — 80048 BASIC METABOLIC PNL TOTAL CA: CPT

## 2024-07-09 RX ORDER — METOPROLOL TARTRATE 50 MG
100 TABLET ORAL DAILY
Refills: 0 | Status: DISCONTINUED | OUTPATIENT
Start: 2024-07-10 | End: 2024-07-09

## 2024-07-09 RX ORDER — BUMETANIDE 0.25 MG/ML
1 INJECTION INTRAMUSCULAR; INTRAVENOUS
Qty: 30 | Refills: 0
Start: 2024-07-09 | End: 2024-08-07

## 2024-07-09 RX ORDER — HYDROCHLOROTHIAZIDE 25 MG
1 TABLET ORAL
Refills: 0 | DISCHARGE

## 2024-07-09 RX ORDER — PREDNISONE 10 MG/1
2 TABLET ORAL
Qty: 8 | Refills: 0
Start: 2024-07-09 | End: 2024-07-12

## 2024-07-09 RX ORDER — GABAPENTIN
1 POWDER (GRAM) MISCELLANEOUS
Qty: 30 | Refills: 0
Start: 2024-07-09 | End: 2024-08-07

## 2024-07-09 RX ORDER — BUMETANIDE 0.25 MG/ML
1 INJECTION INTRAMUSCULAR; INTRAVENOUS DAILY
Refills: 0 | Status: DISCONTINUED | OUTPATIENT
Start: 2024-07-09 | End: 2024-07-09

## 2024-07-09 RX ADMIN — LEVALBUTEROL HYDROCHLORIDE 0.63 MILLIGRAM(S): 1.25 SOLUTION RESPIRATORY (INHALATION) at 03:29

## 2024-07-09 RX ADMIN — BENZOCAINE AND MENTHOL 1 LOZENGE: 15; 3.6 LOZENGE ORAL at 05:00

## 2024-07-09 RX ADMIN — FLUTICASONE PROPIONATE 1 SPRAY(S): 50 SPRAY, METERED NASAL at 05:00

## 2024-07-09 RX ADMIN — PREDNISONE 40 MILLIGRAM(S): 10 TABLET ORAL at 05:50

## 2024-07-09 RX ADMIN — AMLODIPINE BESYLATE 5 MILLIGRAM(S): 2.5 TABLET ORAL at 05:00

## 2024-07-09 RX ADMIN — APIXABAN 5 MILLIGRAM(S): 5 TABLET, FILM COATED ORAL at 11:41

## 2024-07-09 RX ADMIN — DRONEDARONE 400 MILLIGRAM(S): 400 TABLET, FILM COATED ORAL at 11:41

## 2024-07-09 RX ADMIN — PANTOPRAZOLE SODIUM 40 MILLIGRAM(S): 40 INJECTION, POWDER, FOR SOLUTION INTRAVENOUS at 05:00

## 2024-07-09 RX ADMIN — Medication 50 MILLIGRAM(S): at 05:00

## 2024-07-09 RX ADMIN — LEVALBUTEROL HYDROCHLORIDE 0.63 MILLIGRAM(S): 1.25 SOLUTION RESPIRATORY (INHALATION) at 08:52

## 2024-07-09 NOTE — PROGRESS NOTE ADULT - REASON FOR ADMISSION
Upper Back/Throat Pain

## 2024-07-09 NOTE — PROGRESS NOTE ADULT - SUBJECTIVE AND OBJECTIVE BOX
Patient is a 69y old  Female who presents with a chief complaint of Upper Back/Throat Pain (08 Jul 2024 22:25)    Patient seen and examined at bedside.  no acute overnight events    ALLERGIES:  No Known Allergies        Vital Signs Last 24 Hrs  T(F): 97.9 (09 Jul 2024 05:00), Max: 97.9 (09 Jul 2024 05:00)  HR: 96 (09 Jul 2024 05:00) (88 - 105)  BP: 155/92 (09 Jul 2024 05:00) (152/98 - 163/83)  RR: 19 (09 Jul 2024 05:00) (19 - 21)  SpO2: 96% (09 Jul 2024 05:00) (89% - 98%)  I&O's Summary    MEDICATIONS:  aluminum hydroxide/magnesium hydroxide/simethicone Suspension 30 milliLiter(s) Oral every 4 hours PRN  amLODIPine   Tablet 5 milliGRAM(s) Oral daily  apixaban 5 milliGRAM(s) Oral every 12 hours  atorvastatin 40 milliGRAM(s) Oral at bedtime  benzocaine/menthol Lozenge 1 Lozenge Oral three times a day  dronedarone 400 milliGRAM(s) Oral every 12 hours  fluticasone propionate 50 MICROgram(s)/spray Nasal Spray 1 Spray(s) Both Nostrils two times a day  gabapentin 100 milliGRAM(s) Oral at bedtime  levalbuterol Inhalation 0.63 milliGRAM(s) Inhalation every 6 hours  lidocaine   4% Patch 2 Patch Transdermal daily  melatonin 3 milliGRAM(s) Oral at bedtime PRN  metoprolol succinate ER 50 milliGRAM(s) Oral daily  ondansetron Injectable 4 milliGRAM(s) IV Push every 8 hours PRN  pantoprazole    Tablet 40 milliGRAM(s) Oral before breakfast  predniSONE   Tablet 40 milliGRAM(s) Oral daily      PHYSICAL EXAM:  General: NAD, A/O x 3  ENT: MMM, no thrush  Neck: Supple, No JVD  Lungs: Clear to auscultation bilaterally, non labored, good air entry  Cardio: RRR, S1/S2, No murmurs  Abdomen: Soft, Nontender, Nondistended; Bowel sounds present  Extremities: No cyanosis, Mild LE edema L>R    LABS:                        12.4   10.33 )-----------( 240      ( 09 Jul 2024 06:42 )             36.1     07-08    128  |  98  |  37  ----------------------------<  185  4.8   |  20  |  1.15    Ca    9.5      08 Jul 2024 06:08  Mg     1.9     07-07                                  Urinalysis Basic - ( 08 Jul 2024 06:08 )    Color: x / Appearance: x / SG: x / pH: x  Gluc: 185 mg/dL / Ketone: x  / Bili: x / Urobili: x   Blood: x / Protein: x / Nitrite: x   Leuk Esterase: x / RBC: x / WBC x   Sq Epi: x / Non Sq Epi: x / Bacteria: x            RADIOLOGY & ADDITIONAL TESTS:    Care Discussed with Consultants/Other Providers:

## 2024-07-09 NOTE — PROGRESS NOTE ADULT - NS ATTEND AMEND GEN_ALL_CORE FT
70 y/o F with PMH of CREST syndrome, GERD, HTN, HLD, A.fib, s/p loop recorder (2/2024), Left PCA CVA (11/2023), cervical/lumbar spine surgery several years ago, sciatica, c/o upper back pain and throat pain, now improved. Imaging reviewed. Continue home meds as indicated. noted leukocytosis, improving. rvp neg. CASSIDY improved. hyponatremia persists, hold home med hctz. increase norvac to 10mg qd for SBP > 150. noted tachycardia - switch albuterol to xopenex. transition IV solumedrol to prednisone for acute asthma exacerbation. nephro appreciated. dispo home 24-48h
68 y/o F with PMH of CREST syndrome, GERD, HTN, HLD, A.fib, s/p loop recorder (2/2024), Left PCA CVA (11/2023), cervical/lumbar spine surgery several years ago, sciatica, c/o upper back pain and throat pain, now improved. Imaging reviewed. Continue home meds as indicated. noted leukocytosis, improving. rvp neg. noted gamaliel and persistent hyponatremia - follow renal sono, urine studies, urine cx, nephro consult. echo reviewed, EF 55-60%.
68 y/o F with PMH of CREST syndrome, GERD, HTN, HLD, A.fib, s/p loop recorder (2/2024), Left PCA CVA (11/2023), cervical/lumbar spine surgery several years ago, sciatica, c/o upper back pain and throat pain, now improved. Imaging reviewed. Continue home meds as indicated. noted leukocytosis, improving. rvp neg. noted gamaliel and persistent hyponatremia - improving - renal sono reviewed. nephro appreciated, now off IVF. CXR reviewed. hx of asthma, start duonebs, IV solumedrol.
70 y/o F with PMH of CREST syndrome, GERD, HTN, HLD, A.fib, s/p loop recorder (2/2024), Left PCA CVA (11/2023), cervical/lumbar spine surgery several years ago, sciatica, c/o upper back pain and throat pain, now improved. Imaging reviewed. Continue home meds as indicated. noted leukocytosis, check rvp. noted gamaliel - start IVF, monitor bmp. hyponatremia persists- follow urine studies.
70 y/o F with PMH of CREST syndrome, GERD, HTN, HLD, A.fib, s/p loop recorder (2/2024), Left PCA CVA (11/2023), cervical/lumbar spine surgery several years ago, sciatica, c/o upper back pain and throat pain, now improved. Imaging reviewed. Continue home meds as indicated. noted leukocytosis, improving. rvp neg. CASSIDY improved. hyponatremia noted - home med hctz dc'd. nephro appreciated - trial bumex for LE edema. acute asthma exacerbation improved, medically optimized for discharge home

## 2024-07-09 NOTE — PROGRESS NOTE ADULT - SUBJECTIVE AND OBJECTIVE BOX
Comfortable on RA    Vital Signs Last 24 Hrs  T(C): 36.4 (07-09-24 @ 11:45), Max: 36.6 (07-09-24 @ 05:00)  T(F): 97.5 (07-09-24 @ 11:45), Max: 97.9 (07-09-24 @ 05:00)  HR: 77 (07-09-24 @ 11:45) (77 - 102)  BP: 144/87 (07-09-24 @ 11:45) (144/87 - 155/92)  RR: 21 (07-09-24 @ 11:45) (19 - 21)  SpO2: 93% (07-09-24 @ 11:45) (93% - 96%)    s1s2  b/l air entry  soft, ND  sm edema  AO                                 12.4   10.33 )-----------( 240      ( 09 Jul 2024 06:42 )             36.1     09 Jul 2024 06:42    133    |  100    |  35     ----------------------------<  170    4.8     |  25     |  1.16     Ca    9.6        09 Jul 2024 06:42    aluminum hydroxide/magnesium hydroxide/simethicone Suspension 30 milliLiter(s) Oral every 4 hours PRN  apixaban 5 milliGRAM(s) Oral every 12 hours  atorvastatin 40 milliGRAM(s) Oral at bedtime  benzocaine/menthol Lozenge 1 Lozenge Oral three times a day  buMETAnide 1 milliGRAM(s) Oral daily  dronedarone 400 milliGRAM(s) Oral every 12 hours  fluticasone propionate 50 MICROgram(s)/spray Nasal Spray 1 Spray(s) Both Nostrils two times a day  gabapentin 100 milliGRAM(s) Oral at bedtime  levalbuterol Inhalation 0.63 milliGRAM(s) Inhalation every 6 hours  lidocaine   4% Patch 2 Patch Transdermal daily  melatonin 3 milliGRAM(s) Oral at bedtime PRN  ondansetron Injectable 4 milliGRAM(s) IV Push every 8 hours PRN  pantoprazole    Tablet 40 milliGRAM(s) Oral before breakfast  predniSONE   Tablet 40 milliGRAM(s) Oral daily    Impression/Plan:    S/p CT w/IV dye 7/4, contrast CASSIDY  Cr has improved from peak of 2.54 and is likely near baseline   Possible CKD, echogenic kidneys on Renal US  Hx hyponatremia   Na is better today   Would avoid HCTZ going forward   Trial of Bumex 1mg po daily  Avoid excessive fluid intake   F/u w/PMD as op   D/w medicine   D/w pt and family at bedside     [phone]

## 2024-07-09 NOTE — DISCHARGE NOTE NURSING/CASE MANAGEMENT/SOCIAL WORK - PATIENT PORTAL LINK FT
You can access the FollowMyHealth Patient Portal offered by [facility] by registering at the following website: [email or web address]. By joining [facility] FollowMyHealth portal, you will also be able to view your health information using other applications (apps) compatible with our system.

## 2024-07-09 NOTE — PROGRESS NOTE ADULT - ASSESSMENT
69 year old female with past medical history of CREST syndrome, GERD, HTN, HLD, A.fib, s/p loop recorder (2/2024), Left PCA CVA (11/2023), cervical/lumbar spine surgery several years ago, sciatica, who presents from home for 2 day history of upper back pain and throat pain.    #Upper Back Pain 2/2 cervical spinal stenosis/muscle spasms  #History of Cervical Spine Surgery  - CTA chest/abd/pelvis showed No aortic aneurysm or dissection. No pulmonary emboli. 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 Moderate to severe left C6-7 foraminal stenosis. Moderate left T9-10 foraminal stenosis.  - Pain control: trial of tylenol ATC, lidocaine patches, gabapentin 100mg QHS  - Fall precaution  - PT - no PT needs   - Outpatient orthopedic/neurosurgery evaluation     #Esophageal Pain  #GERD  - history of CREST syndrome, possible esophageal dysmotility  - Denies dysphagia/odynophagia  - CTA chest negative for PE, CT abd/pelvis unremarkable  - Continue PPI     #Leukocytosis - resolved  - May be reactive, afebrile, no focal complaints  - WBC much improved, no infectious process apparent    #Mild Hyponatremia  #CASSIDY; Cr 1.3 on admit   - Sodium level ranged 129-133 last admission  - Baseline Cr appears to be ~1.0 range  - renal consult appreciated, CASSIDY due to contrast ATN, doubt scleroderma renal crisis  - Renal sono showed echogenic kidneys, stopped IVF, CXR done and reviewed  - Stopped HCTZ in s/o hyponatremia as per renal, Fluid Restrict 1L per day, If Na not improving renal will order Tolvaptan   - Monitor BMP, Serum Cr at or near baseline, will talk to renal today about possible dc planning    #Elevated BNP  - Trop neg x 2, EKG showed A.flutter HR 90, non-specific T wave abn  - BNP 3094, no evidence of fluid overload on exam  - Echo 11/2023 showed EF 65 to 70%, no significant valvular disease  - Monitor on telemetry  - echo - Left atrial enlargement. LVEF 55 to 60%. There is mild concentric left ventricular hypertrophy. Right heart enlargement Thickening of the anterior and posterior mitral valve leaflets. Trace mitral valve regurgitation.    #HTN/HLD  - Will hold HCTz and ARB given CASSIDY as above  - Continue norvasc, metoprolol  - Monitor BP  - Continue atorvastatin   - Resume zetia on discharge     #A.fib/A.flutter   #Status Post Loop Recorder  - Trop neg x 2, EKG showed A.flutter HR 90, non-specific T wave abn  - Telemetry monitoring  - Continue multaq and metoprolol  - Continue Eliquis     #History of Left PCA CVA  - Continue Eliquis and atorvastatin     #Prediabetes  - HbA1C 5.8 in 11/2023  - Will repeat HbA1C  - Dietary and lifestyle modification     #DVT PPx  - On Eliquis    Dispo: Anticipate medical clearance ~ 24 hours    Plan of care discussed with patient and  at bedside, they are in agreement, all questions are answered

## 2024-07-10 LAB
CHLORIDE UR-SCNC: 38 MMOL/L — SIGNIFICANT CHANGE UP
OSMOLALITY UR: 527 MOSM/KG — SIGNIFICANT CHANGE UP (ref 50–1200)
SODIUM UR-SCNC: 20 MMOL/L — SIGNIFICANT CHANGE UP

## 2024-07-11 LAB
-  AMOXICILLIN/CLAVULANIC ACID: SIGNIFICANT CHANGE UP
-  AMPICILLIN/SULBACTAM: SIGNIFICANT CHANGE UP
-  AMPICILLIN: SIGNIFICANT CHANGE UP
-  AZTREONAM: SIGNIFICANT CHANGE UP
-  CEFAZOLIN: SIGNIFICANT CHANGE UP
-  CEFEPIME: SIGNIFICANT CHANGE UP
-  CEFOXITIN: SIGNIFICANT CHANGE UP
-  CEFTRIAXONE: SIGNIFICANT CHANGE UP
-  CEFUROXIME: SIGNIFICANT CHANGE UP
-  CIPROFLOXACIN: SIGNIFICANT CHANGE UP
-  ERTAPENEM: SIGNIFICANT CHANGE UP
-  GENTAMICIN: SIGNIFICANT CHANGE UP
-  IMIPENEM: SIGNIFICANT CHANGE UP
-  LEVOFLOXACIN: SIGNIFICANT CHANGE UP
-  MEROPENEM: SIGNIFICANT CHANGE UP
-  NITROFURANTOIN: SIGNIFICANT CHANGE UP
-  PIPERACILLIN/TAZOBACTAM: SIGNIFICANT CHANGE UP
-  TOBRAMYCIN: SIGNIFICANT CHANGE UP
-  TRIMETHOPRIM/SULFAMETHOXAZOLE: SIGNIFICANT CHANGE UP
CULTURE RESULTS: ABNORMAL
METHOD TYPE: SIGNIFICANT CHANGE UP
SPECIMEN SOURCE: SIGNIFICANT CHANGE UP

## 2024-11-13 ENCOUNTER — APPOINTMENT (OUTPATIENT)
Dept: NEUROLOGY | Facility: CLINIC | Age: 69
End: 2024-11-13

## 2024-11-26 NOTE — DISCHARGE NOTE PROVIDER - NSDCHHHOMEBOUND_GEN_ALL_CORE
Detail Level: Detailed Add 63570 Cpt? (Important Note: In 2017 The Use Of 71468 Is Being Tracked By Cms To Determine Future Global Period Reimbursement For Global Periods): yes Stroke/TBI (neurological/cognitive impairment)